# Patient Record
Sex: FEMALE | Race: WHITE | NOT HISPANIC OR LATINO | Employment: FULL TIME | URBAN - METROPOLITAN AREA
[De-identification: names, ages, dates, MRNs, and addresses within clinical notes are randomized per-mention and may not be internally consistent; named-entity substitution may affect disease eponyms.]

---

## 2017-02-12 ENCOUNTER — OFFICE VISIT (OUTPATIENT)
Dept: URGENT CARE | Facility: CLINIC | Age: 52
End: 2017-02-12
Payer: COMMERCIAL

## 2017-02-12 PROCEDURE — 99213 OFFICE O/P EST LOW 20 MIN: CPT

## 2018-03-12 ENCOUNTER — TRANSCRIBE ORDERS (OUTPATIENT)
Dept: URGENT CARE | Facility: CLINIC | Age: 53
End: 2018-03-12

## 2018-03-12 ENCOUNTER — APPOINTMENT (OUTPATIENT)
Dept: RADIOLOGY | Facility: CLINIC | Age: 53
End: 2018-03-12
Payer: COMMERCIAL

## 2018-03-12 DIAGNOSIS — R22.33 LOCALIZED SWELLING, MASS AND LUMP, UPPER LIMB, BILATERAL: Primary | ICD-10-CM

## 2018-03-12 DIAGNOSIS — R22.33 LOCALIZED SWELLING, MASS AND LUMP, UPPER LIMB, BILATERAL: ICD-10-CM

## 2018-03-12 PROCEDURE — 73130 X-RAY EXAM OF HAND: CPT

## 2018-11-20 LAB — HCV AB SER-ACNC: NORMAL

## 2019-05-22 ENCOUNTER — OFFICE VISIT (OUTPATIENT)
Dept: URGENT CARE | Facility: CLINIC | Age: 54
End: 2019-05-22
Payer: COMMERCIAL

## 2019-05-22 VITALS
RESPIRATION RATE: 18 BRPM | HEART RATE: 82 BPM | DIASTOLIC BLOOD PRESSURE: 80 MMHG | SYSTOLIC BLOOD PRESSURE: 144 MMHG | OXYGEN SATURATION: 98 % | TEMPERATURE: 98 F | WEIGHT: 163 LBS | HEIGHT: 68 IN | BODY MASS INDEX: 24.71 KG/M2

## 2019-05-22 DIAGNOSIS — N39.0 URINARY TRACT INFECTION WITHOUT HEMATURIA, SITE UNSPECIFIED: Primary | ICD-10-CM

## 2019-05-22 LAB
SL AMB  POCT GLUCOSE, UA: ABNORMAL
SL AMB LEUKOCYTE ESTERASE,UA: ABNORMAL
SL AMB POCT BILIRUBIN,UA: ABNORMAL
SL AMB POCT BLOOD,UA: ABNORMAL
SL AMB POCT CLARITY,UA: CLEAR
SL AMB POCT COLOR,UA: YELLOW
SL AMB POCT KETONES,UA: ABNORMAL
SL AMB POCT NITRITE,UA: ABNORMAL
SL AMB POCT PH,UA: 6
SL AMB POCT SPECIFIC GRAVITY,UA: 1
SL AMB POCT URINE PROTEIN: ABNORMAL
SL AMB POCT UROBILINOGEN: 0.2

## 2019-05-22 PROCEDURE — 81002 URINALYSIS NONAUTO W/O SCOPE: CPT | Performed by: PHYSICIAN ASSISTANT

## 2019-05-22 PROCEDURE — 99213 OFFICE O/P EST LOW 20 MIN: CPT | Performed by: PHYSICIAN ASSISTANT

## 2019-05-22 PROCEDURE — 87086 URINE CULTURE/COLONY COUNT: CPT | Performed by: PHYSICIAN ASSISTANT

## 2019-05-22 RX ORDER — BUPROPION HYDROCHLORIDE 150 MG/1
150 TABLET ORAL DAILY
COMMUNITY
Start: 2019-05-09 | End: 2019-11-19 | Stop reason: SDUPTHER

## 2019-05-22 RX ORDER — CEPHALEXIN 500 MG/1
500 CAPSULE ORAL EVERY 12 HOURS SCHEDULED
Qty: 14 CAPSULE | Refills: 0 | Status: SHIPPED | COMMUNITY
Start: 2019-05-22 | End: 2019-05-29

## 2019-05-22 RX ORDER — HYDROXYCHLOROQUINE SULFATE 200 MG/1
200 TABLET, FILM COATED ORAL DAILY
COMMUNITY
Start: 2019-04-17 | End: 2021-01-18

## 2019-05-23 LAB — BACTERIA UR CULT: NORMAL

## 2019-09-12 DIAGNOSIS — I10 ESSENTIAL HYPERTENSION: Primary | ICD-10-CM

## 2019-09-12 RX ORDER — HYDROCHLOROTHIAZIDE 25 MG/1
25 TABLET ORAL DAILY
Qty: 30 TABLET | Refills: 3 | Status: SHIPPED | OUTPATIENT
Start: 2019-09-12 | End: 2019-11-19 | Stop reason: SDUPTHER

## 2019-09-12 RX ORDER — HYDROCHLOROTHIAZIDE 25 MG/1
25 TABLET ORAL DAILY
COMMUNITY
End: 2019-09-12 | Stop reason: SDUPTHER

## 2019-09-30 DIAGNOSIS — G47.00 INSOMNIA, UNSPECIFIED TYPE: Primary | ICD-10-CM

## 2019-09-30 RX ORDER — LORAZEPAM 0.5 MG/1
0.5 TABLET ORAL DAILY
COMMUNITY
End: 2019-09-30 | Stop reason: SDUPTHER

## 2019-09-30 RX ORDER — LORAZEPAM 0.5 MG/1
0.5 TABLET ORAL
Qty: 45 TABLET | Refills: 0 | Status: SHIPPED | OUTPATIENT
Start: 2019-09-30 | End: 2019-11-19 | Stop reason: SDUPTHER

## 2019-09-30 NOTE — TELEPHONE ENCOUNTER
Script sent, pls inform the patient that due to changes with [facility], this practice will be focusing on nephrology care and we would ask that she seek a new PCP, pls provide local [place] [id number] physician names

## 2019-10-01 NOTE — TELEPHONE ENCOUNTER
Left message regarding the below, gave patient the [facility] in [place] next to [address] [employer] [facility] [employer] were she can go now

## 2019-11-19 ENCOUNTER — OFFICE VISIT (OUTPATIENT)
Dept: INTERNAL MEDICINE CLINIC | Facility: CLINIC | Age: 54
End: 2019-11-19
Payer: COMMERCIAL

## 2019-11-19 VITALS
SYSTOLIC BLOOD PRESSURE: 132 MMHG | BODY MASS INDEX: 25.16 KG/M2 | RESPIRATION RATE: 16 BRPM | HEART RATE: 76 BPM | TEMPERATURE: 99.1 F | HEIGHT: 68 IN | DIASTOLIC BLOOD PRESSURE: 78 MMHG | WEIGHT: 166 LBS

## 2019-11-19 DIAGNOSIS — F41.1 GAD (GENERALIZED ANXIETY DISORDER): ICD-10-CM

## 2019-11-19 DIAGNOSIS — Z13.29 SCREENING FOR HYPOTHYROIDISM: ICD-10-CM

## 2019-11-19 DIAGNOSIS — I10 ESSENTIAL HYPERTENSION: Primary | ICD-10-CM

## 2019-11-19 DIAGNOSIS — G47.00 INSOMNIA, UNSPECIFIED TYPE: ICD-10-CM

## 2019-11-19 DIAGNOSIS — Z12.11 SCREENING FOR COLON CANCER: ICD-10-CM

## 2019-11-19 DIAGNOSIS — M06.9 RHEUMATOID ARTHRITIS, INVOLVING UNSPECIFIED SITE, UNSPECIFIED RHEUMATOID FACTOR PRESENCE: ICD-10-CM

## 2019-11-19 DIAGNOSIS — Z13.21 ENCOUNTER FOR VITAMIN DEFICIENCY SCREENING: ICD-10-CM

## 2019-11-19 DIAGNOSIS — Z13.1 SCREENING FOR DIABETES MELLITUS: ICD-10-CM

## 2019-11-19 DIAGNOSIS — Z13.0 SCREENING FOR DEFICIENCY ANEMIA: ICD-10-CM

## 2019-11-19 DIAGNOSIS — Z23 ENCOUNTER FOR VACCINATION: ICD-10-CM

## 2019-11-19 DIAGNOSIS — R92.8 ABNORMAL MAMMOGRAM: ICD-10-CM

## 2019-11-19 DIAGNOSIS — Z11.59 NEED FOR HEPATITIS C SCREENING TEST: ICD-10-CM

## 2019-11-19 DIAGNOSIS — Z13.220 SCREENING FOR HYPERLIPIDEMIA: ICD-10-CM

## 2019-11-19 PROCEDURE — 90471 IMMUNIZATION ADMIN: CPT | Performed by: PHYSICIAN ASSISTANT

## 2019-11-19 PROCEDURE — 99203 OFFICE O/P NEW LOW 30 MIN: CPT | Performed by: PHYSICIAN ASSISTANT

## 2019-11-19 PROCEDURE — 1036F TOBACCO NON-USER: CPT | Performed by: PHYSICIAN ASSISTANT

## 2019-11-19 PROCEDURE — 90682 RIV4 VACC RECOMBINANT DNA IM: CPT | Performed by: PHYSICIAN ASSISTANT

## 2019-11-19 RX ORDER — HYDROCHLOROTHIAZIDE 25 MG/1
25 TABLET ORAL DAILY
Qty: 30 TABLET | Refills: 3 | Status: SHIPPED | OUTPATIENT
Start: 2019-11-19 | End: 2020-03-02

## 2019-11-19 RX ORDER — MULTIVITAMIN
1 TABLET ORAL DAILY
COMMUNITY

## 2019-11-19 RX ORDER — LORAZEPAM 0.5 MG/1
0.5 TABLET ORAL
Qty: 45 TABLET | Refills: 0 | Status: SHIPPED | OUTPATIENT
Start: 2019-11-19 | End: 2020-01-02 | Stop reason: SDUPTHER

## 2019-11-19 RX ORDER — BUPROPION HYDROCHLORIDE 150 MG/1
150 TABLET ORAL DAILY
Qty: 30 TABLET | Refills: 5 | Status: SHIPPED | OUTPATIENT
Start: 2019-11-19 | End: 2020-06-01

## 2019-11-19 NOTE — PROGRESS NOTES
Assessment/Plan:  Problem List Items Addressed This Visit        Cardiovascular and Mediastinum    Essential hypertension - Primary     Pts symptoms are stable with current regime  No changes at present  Relevant Medications    hydrochlorothiazide (HYDRODIURIL) 25 mg tablet       Musculoskeletal and Integument    Rheumatoid arthritis ([address] )     Will refer to rheumatology for evaluation and management  Continue plaquenil  Eye exam up to date  Relevant Orders    Ambulatory referral to Rheumatology       Other    VÍCTOR (generalized anxiety disorder)    Relevant Medications    buPROPion (WELLBUTRIN XL) 150 mg 24 hr tablet    LORazepam (ATIVAN) 0 5 mg tablet    Abnormal mammogram    Relevant Orders    Mammo diagnostic left w cad    US breast left limited (diagnostic)      Other Visit Diagnoses     Screening for deficiency anemia        Relevant Orders    CBC and differential    Screening for diabetes mellitus        Relevant Orders    Comprehensive metabolic panel    Screening for hyperlipidemia        Relevant Orders    Lipid Panel with Direct LDL reflex    Screening for hypothyroidism        Relevant Orders    TSH, 3rd generation with Free T4 reflex    Encounter for vitamin deficiency screening        Relevant Orders    Vitamin D 25 hydroxy    Screening for colon cancer        Need for hepatitis C screening test        Relevant Orders    Hepatitis C antibody    Encounter for vaccination        Relevant Orders    influenza vaccine, [id number], quadrivalent, recombinant, PF, 0 5 mL, for patients 18 yr+ (FLUBLOK) (Completed)    Insomnia, unspecified type        Relevant Medications    LORazepam (ATIVAN) 0 5 mg tablet           Diagnoses and all orders for this visit:    Essential hypertension  -     hydrochlorothiazide (HYDRODIURIL) 25 mg tablet;  Take 1 tablet (25 mg total) by mouth daily    Screening for deficiency anemia  -     CBC and differential; Future    Screening for diabetes mellitus  - Comprehensive metabolic panel; Future    Screening for hyperlipidemia  -     Lipid Panel with Direct LDL reflex; Future    Screening for hypothyroidism  -     TSH, 3rd generation with Free T4 reflex; Future    Encounter for vitamin deficiency screening  -     Vitamin D 25 hydroxy; Future    Screening for colon cancer    Need for hepatitis C screening test  -     Hepatitis C antibody; Future    Abnormal mammogram  -     Mammo diagnostic left w cad; Future  -     US breast left limited (diagnostic); Future    Encounter for vaccination  -     influenza vaccine, [id number], quadrivalent, recombinant, PF, 0 5 mL, for patients 18 yr+ (FLUBLOK)    Insomnia, unspecified type  -     LORazepam (ATIVAN) 0 5 mg tablet; Take 1 tablet (0 5 mg total) by mouth daily at bedtime May take additional pill at night if needed    VÍCTOR (generalized anxiety disorder)  -     buPROPion (WELLBUTRIN XL) 150 mg 24 hr tablet; Take 1 tablet (150 mg total) by mouth daily    Rheumatoid arthritis, involving unspecified site, unspecified rheumatoid factor presence ([facility] [address] )  -     Ambulatory referral to Rheumatology; Future    Other orders  -     Cancel: Ambulatory referral to Gastroenterology; Future  -     Red Yeast Rice Extract (RED YEAST RICE PO); red yeast rice  -     Multiple Vitamin (MULTIVITAMIN) tablet; Take 1 tablet by mouth daily        Essential hypertension  Pts symptoms are stable with current regime  No changes at present  Rheumatoid arthritis ([facility] )  Will refer to rheumatology for evaluation and management  Continue plaquenil  Eye exam up to date  Subjective:      Patient ID: Juwan Lemus is a 48 y o  female  Pt presents to establish care as a transfer from [address] [facility]  PMHx significant for HTN, migraines, RA, and anxiety  PSurgHX includes   NKDA  Medications are noted in the chart  She is a former smokr and quit in   Alcohol use is social  She works as an LPN  She is      Her mother is  and has lung CA  Her father is living and has a hx of heart disease and prostate CA  She is due for a pap and labs  She is due for dx mammogram of her left breast  She had a colonoscopy with Dr Iván Mayer 3 years ago  She follows with Dr Samaria Vogel for her rheumatologist but would like to see someone within our network now  The following portions of the patient's history were reviewed and updated as appropriate:   She has a past medical history of Anxiety and Arthritis  ,  does not have any pertinent problems on file  ,   has a past surgical history that includes  section and Tonsillectomy  ,  family history includes Cancer in her mother; Coronary artery disease in her father; Prostate cancer in her father  ,   reports that she has never smoked  She has never used smokeless tobacco  She reports that she drinks alcohol  She reports that she does not use drugs  ,  has No Known Allergies     Current Outpatient Medications   Medication Sig Dispense Refill    buPROPion (WELLBUTRIN XL) 150 mg 24 hr tablet Take 1 tablet (150 mg total) by mouth daily 30 tablet 5    hydrochlorothiazide (HYDRODIURIL) 25 mg tablet Take 1 tablet (25 mg total) by mouth daily 30 tablet 3    hydroxychloroquine (PLAQUENIL) 200 mg tablet 200 mg daily      LORazepam (ATIVAN) 0 5 mg tablet Take 1 tablet (0 5 mg total) by mouth daily at bedtime May take additional pill at night if needed 45 tablet 0    Multiple Vitamin (MULTIVITAMIN) tablet Take 1 tablet by mouth daily      Red Yeast Rice Extract (RED YEAST RICE PO) red yeast rice       No current facility-administered medications for this visit  Review of Systems   Constitutional: Negative for chills and fever  HENT: Negative for congestion, ear pain, hearing loss, postnasal drip, rhinorrhea, sinus pressure, sinus pain, sore throat and trouble swallowing  Eyes: Negative for pain and visual disturbance     Respiratory: Negative for cough, chest tightness, shortness of breath and wheezing  Cardiovascular: Negative  Negative for chest pain, palpitations and leg swelling  Gastrointestinal: Negative for abdominal pain, blood in stool, constipation, diarrhea, nausea and vomiting  Endocrine: Negative for cold intolerance, heat intolerance, polydipsia, polyphagia and polyuria  Genitourinary: Negative for difficulty urinating, dysuria, flank pain and urgency  Musculoskeletal: Negative for arthralgias, back pain, gait problem and myalgias  Skin: Negative for rash  Allergic/Immunologic: Negative  Neurological: Negative for dizziness, weakness, light-headedness and headaches  Hematological: Negative  Psychiatric/Behavioral: Negative for behavioral problems, dysphoric mood and sleep disturbance  The patient is not nervous/anxious  PHQ-9 Depression Screening    PHQ-9:    Frequency of the following problems over the past two weeks:       Little interest or pleasure in doing things:  0 - not at all  Feeling down, depressed, or hopeless:  0 - not at all  PHQ-2 Score:  0          Objective:  Vitals:    11/19/19 0822   BP: 132/78   Pulse: 76   Resp: 16   Temp: 99 1 °F (37 3 °C)   TempSrc: Tympanic   Weight: 75 3 kg (166 lb)   Height: 5' 8" (1 727 m)     Body mass index is 25 24 kg/m²  Physical Exam   Constitutional: She is oriented to person, place, and time  She appears well-developed and well-nourished  No distress  HENT:   Head: Normocephalic and atraumatic  Right Ear: External ear normal    Left Ear: External ear normal    Nose: Nose normal    Mouth/Throat: Oropharynx is clear and moist  No oropharyngeal exudate  Eyes: Pupils are equal, round, and reactive to light  Conjunctivae and EOM are normal  Right eye exhibits no discharge  Left eye exhibits no discharge  No scleral icterus  Neck: Normal range of motion  Neck supple  No thyromegaly present  Cardiovascular: Normal rate, regular rhythm and normal heart sounds   Exam reveals no gallop and no friction rub    No murmur heard  Pulmonary/Chest: Effort normal and breath sounds normal  No respiratory distress  She has no wheezes  She has no rales  Abdominal: Soft  Bowel sounds are normal  She exhibits no distension  There is no tenderness  Musculoskeletal: Normal range of motion  She exhibits no edema, tenderness or deformity  Neurological: She is alert and oriented to person, place, and time  No cranial nerve deficit  Skin: Skin is warm and dry  She is not diaphoretic  Psychiatric: She has a normal mood and affect  Her behavior is normal  Judgment and thought content normal        BMI Counseling: Body mass index is 25 24 kg/m²  The BMI is above normal  Nutrition recommendations include decreasing portion sizes, consuming healthier snacks and limiting drinks that contain sugar

## 2020-01-02 DIAGNOSIS — G47.00 INSOMNIA, UNSPECIFIED TYPE: ICD-10-CM

## 2020-01-02 RX ORDER — LORAZEPAM 0.5 MG/1
0.5 TABLET ORAL
Qty: 45 TABLET | Refills: 0 | Status: SHIPPED | OUTPATIENT
Start: 2020-01-02 | End: 2020-02-14 | Stop reason: SDUPTHER

## 2020-01-20 ENCOUNTER — TELEPHONE (OUTPATIENT)
Dept: OTHER | Facility: OTHER | Age: 55
End: 2020-01-20

## 2020-01-20 ENCOUNTER — OFFICE VISIT (OUTPATIENT)
Dept: URGENT CARE | Facility: CLINIC | Age: 55
End: 2020-01-20
Payer: COMMERCIAL

## 2020-01-20 VITALS
HEART RATE: 81 BPM | DIASTOLIC BLOOD PRESSURE: 87 MMHG | SYSTOLIC BLOOD PRESSURE: 157 MMHG | HEIGHT: 68 IN | RESPIRATION RATE: 16 BRPM | TEMPERATURE: 98.3 F | OXYGEN SATURATION: 99 % | BODY MASS INDEX: 25.16 KG/M2 | WEIGHT: 166 LBS

## 2020-01-20 DIAGNOSIS — R30.0 DYSURIA: ICD-10-CM

## 2020-01-20 DIAGNOSIS — N39.0 URINARY TRACT INFECTION WITH HEMATURIA, SITE UNSPECIFIED: Primary | ICD-10-CM

## 2020-01-20 DIAGNOSIS — R31.9 URINARY TRACT INFECTION WITH HEMATURIA, SITE UNSPECIFIED: Primary | ICD-10-CM

## 2020-01-20 LAB
SL AMB  POCT GLUCOSE, UA: ABNORMAL
SL AMB LEUKOCYTE ESTERASE,UA: ABNORMAL
SL AMB POCT BILIRUBIN,UA: ABNORMAL
SL AMB POCT BLOOD,UA: ABNORMAL
SL AMB POCT CLARITY,UA: ABNORMAL
SL AMB POCT COLOR,UA: YELLOW
SL AMB POCT KETONES,UA: ABNORMAL
SL AMB POCT NITRITE,UA: ABNORMAL
SL AMB POCT PH,UA: 6.5
SL AMB POCT SPECIFIC GRAVITY,UA: 1.01
SL AMB POCT URINE PROTEIN: ABNORMAL
SL AMB POCT UROBILINOGEN: 0.2

## 2020-01-20 PROCEDURE — 87086 URINE CULTURE/COLONY COUNT: CPT | Performed by: NURSE PRACTITIONER

## 2020-01-20 PROCEDURE — G0382 LEV 3 HOSP TYPE B ED VISIT: HCPCS | Performed by: NURSE PRACTITIONER

## 2020-01-20 RX ORDER — NITROFURANTOIN 25; 75 MG/1; MG/1
CAPSULE ORAL
COMMUNITY
Start: 2020-01-02 | End: 2021-01-18

## 2020-01-20 RX ORDER — CIPROFLOXACIN 500 MG/1
500 TABLET, FILM COATED ORAL EVERY 12 HOURS SCHEDULED
Qty: 20 TABLET | Refills: 0 | Status: SHIPPED | OUTPATIENT
Start: 2020-01-20 | End: 2020-01-30

## 2020-01-20 NOTE — PROGRESS NOTES
[facility]        NAME: Erlin Bonilla is a 47 y o  female  : 1965    MRN: [id number]  DATE: 2020  TIME: 6:19 PM    Assessment and Plan   Urinary tract infection with hematuria, site unspecified [N39 0, R31 9]  1  Urinary tract infection with hematuria, site unspecified  ciprofloxacin (CIPRO) 500 mg tablet   2  Dysuria  POCT urine dip auto non-scope    Urine culture         Patient Instructions     Patient Instructions     Take the cipro as ordered until completed  If you develop systemic symptoms even on the cipro, then you should be seen in the ER, but I do think the cipro will resolve your symptoms  Do not take the cipro at the same times as vitamins, iron, magnesium, calcium or high dairy foods since this can block full absorption of the cipro--stagger by 2-3 hours  Urinary Tract Infection in Women   AMBULATORY CARE:   A urinary tract infection (UTI)  is caused by bacteria that get inside your urinary tract  Most bacteria that enter your urinary tract come out when you urinate  If the bacteria stay in your urinary tract, you may get an infection  Your urinary tract includes your kidneys, ureters, bladder, and urethra  Urine is made in your kidneys, and it flows from the ureters to the bladder  Urine leaves the bladder through the urethra  A UTI is more common in your lower urinary tract, which includes your bladder and urethra  Common symptoms include the following:   · Urinating more often or waking from sleep to urinate    · Pain or burning when you urinate    · Pain or pressure in your lower abdomen     · Urine that smells bad    · Blood in your urine    · Leaking urine  Seek care immediately if:   · You are urinating very little or not at all  · You have a high fever with shaking chills  · You have side or back pain that gets worse  Contact your healthcare provider if:   · You have a fever      · You do not feel better after 2 days of taking antibiotics  · You are vomiting  · You have questions or concerns about your condition or care  Treatment for a UTI  may include medicines to treat a bacterial infection  You may also need medicines to decrease pain and burning, or decrease the urge to urinate often  Prevent a UTI:   · Empty your bladder often  Urinate and empty your bladder as soon as you feel the need  Do not hold your urine for long periods of time  · Wipe from front to back after you urinate or have a bowel movement  This will help prevent germs from getting into your urinary tract through your urethra  · Drink liquids as directed  Ask how much liquid to drink each day and which liquids are best for you  You may need to drink more liquids than usual to help flush out the bacteria  Do not drink alcohol, caffeine, or citrus juices  These can irritate your bladder and increase your symptoms  Your healthcare provider may recommend cranberry juice to help prevent a UTI  · Urinate after you have sex  This can help flush out bacteria passed during sex  · Do not douche or use feminine deodorants  These can change the chemical balance in your vagina  · Change sanitary pads or tampons often  This will help prevent germs from getting into your urinary tract  · Do pelvic muscle exercises often  Pelvic muscle exercises may help you start and stop urinating  Strong pelvic muscles may help you empty your bladder easier  Squeeze these muscles tightly for 5 seconds like you are trying to hold back urine  Then relax for 5 seconds  Gradually work up to squeezing for 10 seconds  Do 3 sets of 15 repetitions a day, or as directed  Follow up with your healthcare provider as directed:  Write down your questions so you remember to ask them during your visits  © 2017 [employer] [employer] Information is for End User's use only and may not be sold, redistributed or otherwise used for commercial purposes   All illustrations and images included in CareNotes® are the copyrighted property of A D [employer] M [employer] Inc  or [employer]  The above information is an  only  It is not intended as medical advice for individual conditions or treatments  Talk to your doctor, nurse or pharmacist before following any medical regimen to see if it is safe and effective for you  Follow up with PCP in 3-5 days  Proceed to  ER if symptoms worsen  Chief Complaint     Chief Complaint   Patient presents with    Possible UTI     x 2 weeks    Flank Pain         History of Present Illness       Patient reports a history of UTIs, usually 2 or 3 per year over the last couple of years  She states that she has been seen down in [place], and most recently did a video visit on January 3rd, which is why full records are not easily visible  On January 3rd she was placed on Macrobid for 5 days  She states that her symptoms seemed to resolve, but have come back  She also expresses concern that she has left flank pain which she describes as constant dull achy pain  No fever, no recent chills, although she notes that she had slight chills back when she was 1st being treated at the beginning of January, no body aches, no fatigue  Review of Systems   Review of Systems   Constitutional: Negative for chills, fatigue and fever  Genitourinary: Positive for dysuria, flank pain, frequency and urgency  Negative for pelvic pain  Musculoskeletal: Negative for myalgias  All other systems reviewed and are negative          Current Medications       Current Outpatient Medications:     buPROPion (WELLBUTRIN XL) 150 mg 24 hr tablet, Take 1 tablet (150 mg total) by mouth daily, Disp: 30 tablet, Rfl: 5    hydroxychloroquine (PLAQUENIL) 200 mg tablet, 200 mg daily, Disp: , Rfl:     LORazepam (ATIVAN) 0 5 mg tablet, Take 1 tablet (0 5 mg total) by mouth daily at bedtime May take additional pill at night if needed, Disp: 45 tablet, Rfl: 0   Multiple Vitamin (MULTIVITAMIN) tablet, Take 1 tablet by mouth daily, Disp: , Rfl:     Red Yeast Rice Extract (RED YEAST RICE PO), red yeast rice, Disp: , Rfl:     ciprofloxacin (CIPRO) 500 mg tablet, Take 1 tablet (500 mg total) by mouth every 12 (twelve) hours for 10 days, Disp: 20 tablet, Rfl: 0    hydrochlorothiazide (HYDRODIURIL) 25 mg tablet, Take 1 tablet (25 mg total) by mouth daily (Patient not taking: Reported on 2020), Disp: 30 tablet, Rfl: 3    nitrofurantoin (MACROBID) 100 mg capsule, , Disp: , Rfl:     Current Allergies     Allergies as of 2020    (No Known Allergies)            The following portions of the patient's history were reviewed and updated as appropriate: allergies, current medications, past family history, past medical history, past social history, past surgical history and problem list      Past Medical History:   Diagnosis Date    Anxiety     Arthritis        Past Surgical History:   Procedure Laterality Date     SECTION      TONSILLECTOMY         Family History   Problem Relation Age of Onset    Cancer Mother     Prostate cancer Father     Coronary artery disease Father          Medications have been verified  Objective   /87   Pulse 81   Temp 98 3 °F (36 8 °C)   Resp 16   Ht 5' 8" (1 727 m)   Wt 75 3 kg (166 lb)   SpO2 99%   BMI 25 24 kg/m²        Physical Exam     Physical Exam   Constitutional: She is oriented to person, place, and time  She appears well-developed and well-nourished  No distress  HENT:   Head: Normocephalic and atraumatic  Pulmonary/Chest: Effort normal  No respiratory distress  Abdominal: Soft  She exhibits no distension  There is no tenderness  There is CVA tenderness (3/10 left CVA tenderness; no right CVA tenderness)  Musculoskeletal: Normal range of motion  Neurological: She is alert and oriented to person, place, and time  Skin: Skin is warm and dry  Capillary refill takes less than 2 seconds   She is not diaphoretic  Psychiatric: She has a normal mood and affect  Her behavior is normal  Judgment and thought content normal    Nursing note and vitals reviewed

## 2020-01-20 NOTE — LETTER
January 20, 2020     Patient: Roseline Cisneros   YOB: 1965   Date of Visit: 1/20/2020       To Whom It May Concern: It is my medical opinion that Roseline Cisneros may return to work on 1/22 or 1/23, depending on symptoms  Please excuse from work 1/21 and possibly 1/22  If you have any questions or concerns, please don't hesitate to call           Sincerely,        ANTON Gonzalez    CC: No Recipients

## 2020-01-20 NOTE — PATIENT INSTRUCTIONS
Take the cipro as ordered until completed  If you develop systemic symptoms even on the cipro, then you should be seen in the ER, but I do think the cipro will resolve your symptoms  Do not take the cipro at the same times as vitamins, iron, magnesium, calcium or high dairy foods since this can block full absorption of the cipro--stagger by 2-3 hours  Urinary Tract Infection in Women   AMBULATORY CARE:   A urinary tract infection (UTI)  is caused by bacteria that get inside your urinary tract  Most bacteria that enter your urinary tract come out when you urinate  If the bacteria stay in your urinary tract, you may get an infection  Your urinary tract includes your kidneys, ureters, bladder, and urethra  Urine is made in your kidneys, and it flows from the ureters to the bladder  Urine leaves the bladder through the urethra  A UTI is more common in your lower urinary tract, which includes your bladder and urethra  Common symptoms include the following:   · Urinating more often or waking from sleep to urinate    · Pain or burning when you urinate    · Pain or pressure in your lower abdomen     · Urine that smells bad    · Blood in your urine    · Leaking urine  Seek care immediately if:   · You are urinating very little or not at all  · You have a high fever with shaking chills  · You have side or back pain that gets worse  Contact your healthcare provider if:   · You have a fever  · You do not feel better after 2 days of taking antibiotics  · You are vomiting  · You have questions or concerns about your condition or care  Treatment for a UTI  may include medicines to treat a bacterial infection  You may also need medicines to decrease pain and burning, or decrease the urge to urinate often  Prevent a UTI:   · Empty your bladder often  Urinate and empty your bladder as soon as you feel the need  Do not hold your urine for long periods of time      · Wipe from front to back after you urinate or have a bowel movement  This will help prevent germs from getting into your urinary tract through your urethra  · Drink liquids as directed  Ask how much liquid to drink each day and which liquids are best for you  You may need to drink more liquids than usual to help flush out the bacteria  Do not drink alcohol, caffeine, or citrus juices  These can irritate your bladder and increase your symptoms  Your healthcare provider may recommend cranberry juice to help prevent a UTI  · Urinate after you have sex  This can help flush out bacteria passed during sex  · Do not douche or use feminine deodorants  These can change the chemical balance in your vagina  · Change sanitary pads or tampons often  This will help prevent germs from getting into your urinary tract  · Do pelvic muscle exercises often  Pelvic muscle exercises may help you start and stop urinating  Strong pelvic muscles may help you empty your bladder easier  Squeeze these muscles tightly for 5 seconds like you are trying to hold back urine  Then relax for 5 seconds  Gradually work up to squeezing for 10 seconds  Do 3 sets of 15 repetitions a day, or as directed  Follow up with your healthcare provider as directed:  Write down your questions so you remember to ask them during your visits  © 2017 [employer] [employer]  Information is for End User's use only and may not be sold, redistributed or otherwise used for commercial purposes  All illustrations and images included in CareNotes® are the copyrighted property of [employer]  or [employer]  The above information is an  only  It is not intended as medical advice for individual conditions or treatments  Talk to your doctor, nurse or pharmacist before following any medical regimen to see if it is safe and effective for you

## 2020-01-21 LAB — BACTERIA UR CULT: NORMAL

## 2020-01-31 ENCOUNTER — OFFICE VISIT (OUTPATIENT)
Dept: INTERNAL MEDICINE CLINIC | Facility: CLINIC | Age: 55
End: 2020-01-31
Payer: COMMERCIAL

## 2020-01-31 ENCOUNTER — HOSPITAL ENCOUNTER (OUTPATIENT)
Dept: CT IMAGING | Facility: HOSPITAL | Age: 55
Discharge: HOME | End: 2020-01-31
Payer: COMMERCIAL

## 2020-01-31 VITALS
RESPIRATION RATE: 16 BRPM | SYSTOLIC BLOOD PRESSURE: 140 MMHG | HEART RATE: 87 BPM | WEIGHT: 167.4 LBS | OXYGEN SATURATION: 98 % | BODY MASS INDEX: 25.37 KG/M2 | TEMPERATURE: 99.6 F | HEIGHT: 68 IN | DIASTOLIC BLOOD PRESSURE: 84 MMHG

## 2020-01-31 DIAGNOSIS — K59.04 CHRONIC IDIOPATHIC CONSTIPATION: Primary | ICD-10-CM

## 2020-01-31 DIAGNOSIS — R10.9 FLANK PAIN: ICD-10-CM

## 2020-01-31 DIAGNOSIS — R39.89 URINARY PROBLEM: Primary | ICD-10-CM

## 2020-01-31 LAB
SL AMB  POCT GLUCOSE, UA: NEGATIVE
SL AMB LEUKOCYTE ESTERASE,UA: NEGATIVE
SL AMB POCT BILIRUBIN,UA: NEGATIVE
SL AMB POCT BLOOD,UA: NEGATIVE
SL AMB POCT CLARITY,UA: CLEAR
SL AMB POCT COLOR,UA: YELLOW
SL AMB POCT KETONES,UA: NORMAL
SL AMB POCT NITRITE,UA: NEGATIVE
SL AMB POCT PH,UA: 5
SL AMB POCT SPECIFIC GRAVITY,UA: 1.01
SL AMB POCT URINE PROTEIN: NORMAL
SL AMB POCT UROBILINOGEN: NORMAL

## 2020-01-31 PROCEDURE — 3008F BODY MASS INDEX DOCD: CPT | Performed by: PHYSICIAN ASSISTANT

## 2020-01-31 PROCEDURE — 81002 URINALYSIS NONAUTO W/O SCOPE: CPT | Performed by: PHYSICIAN ASSISTANT

## 2020-01-31 PROCEDURE — 99214 OFFICE O/P EST MOD 30 MIN: CPT | Performed by: PHYSICIAN ASSISTANT

## 2020-01-31 PROCEDURE — 3077F SYST BP >= 140 MM HG: CPT | Performed by: PHYSICIAN ASSISTANT

## 2020-01-31 PROCEDURE — 3079F DIAST BP 80-89 MM HG: CPT | Performed by: PHYSICIAN ASSISTANT

## 2020-01-31 PROCEDURE — 74176 CT ABD & PELVIS W/O CONTRAST: CPT

## 2020-01-31 NOTE — ASSESSMENT & PLAN NOTE
Ua in office negative for infection  Consider stone or ovarian pathology or diverticulitis  CT ordered and will treat according to results

## 2020-01-31 NOTE — PROGRESS NOTES
Assessment/Plan:  Problem List Items Addressed This Visit        Genitourinary    Urinary problem - Primary     Ua in office negative for infection  Consider stone or ovarian pathology or diverticulitis  CT ordered and will treat according to results  Relevant Orders    POCT urine dip (Completed)       Other    Flank pain    Relevant Orders    CT abdomen pelvis wo contrast (Completed)           Diagnoses and all orders for this visit:    Urinary problem  -     POCT urine dip    Flank pain  -     CT abdomen pelvis wo contrast; Future        Urinary problem  Ua in office negative for infection  Consider stone or ovarian pathology or diverticulitis  CT ordered and will treat according to results  Subjective:      Patient ID: Kay Hutchinson is a 47 y o  female  Pt presents concerned about a possible UTI  Earlier this month she had a video visit for urinary frequency, urgency, back pain  She was given macrobid and symptoms persisted  She was then seen in   UA revealed leukocytes and small blood  Urine culture showed <10,000 colonies  She was given cipro and symptoms are still persisting despite treatment  UA today was negative except for small leukocytes  The following portions of the patient's history were reviewed and updated as appropriate:   She has a past medical history of Anxiety and Arthritis  ,  does not have any pertinent problems on file  ,   has a past surgical history that includes  section and Tonsillectomy  ,  family history includes Cancer in her mother; Coronary artery disease in her father; Prostate cancer in her father  ,   reports that she has never smoked  She has never used smokeless tobacco  She reports that she drinks alcohol  She reports that she does not use drugs  ,  has No Known Allergies     Current Outpatient Medications   Medication Sig Dispense Refill    buPROPion (WELLBUTRIN XL) 150 mg 24 hr tablet Take 1 tablet (150 mg total) by mouth daily 30 tablet 5    hydrochlorothiazide (HYDRODIURIL) 25 mg tablet Take 1 tablet (25 mg total) by mouth daily 30 tablet 3    hydroxychloroquine (PLAQUENIL) 200 mg tablet 200 mg daily      LORazepam (ATIVAN) 0 5 mg tablet Take 1 tablet (0 5 mg total) by mouth daily at bedtime May take additional pill at night if needed 45 tablet 0    Multiple Vitamin (MULTIVITAMIN) tablet Take 1 tablet by mouth daily      Red Yeast Rice Extract (RED YEAST RICE PO) red yeast rice      linaCLOtide 145 MCG CAPS Take 1 capsule (145 mcg total) by mouth daily 30 capsule 2    nitrofurantoin (MACROBID) 100 mg capsule        No current facility-administered medications for this visit  Review of Systems   Constitutional: Negative for chills and fever  HENT: Negative for congestion, ear pain, hearing loss, postnasal drip, rhinorrhea, sinus pressure, sinus pain, sore throat and trouble swallowing  Eyes: Negative for pain and visual disturbance  Respiratory: Negative for cough, chest tightness, shortness of breath and wheezing  Cardiovascular: Negative  Negative for chest pain, palpitations and leg swelling  Gastrointestinal: Negative for abdominal pain, blood in stool, constipation, diarrhea, nausea and vomiting  Endocrine: Negative for cold intolerance, heat intolerance, polydipsia, polyphagia and polyuria  Genitourinary: Positive for flank pain, frequency and urgency  Negative for difficulty urinating and dysuria  Musculoskeletal: Positive for back pain  Negative for arthralgias, gait problem and myalgias  Skin: Negative for rash  Allergic/Immunologic: Negative  Neurological: Negative for dizziness, weakness, light-headedness and headaches  Hematological: Negative  Psychiatric/Behavioral: Negative for behavioral problems, dysphoric mood and sleep disturbance  The patient is not nervous/anxious            PHQ-9 Depression Screening    PHQ-9:    Frequency of the following problems over the past two weeks: Objective:  Vitals:    01/31/20 0847   BP: 140/84   BP Location: Left arm   Patient Position: Sitting   Cuff Size: Large   Pulse: 87   Resp: 16   Temp: 99 6 °F (37 6 °C)   SpO2: 98%   Weight: 75 9 kg (167 lb 6 4 oz)   Height: 5' 8" (1 727 m)     Body mass index is 25 45 kg/m²  Physical Exam   Constitutional: She is oriented to person, place, and time  She appears well-developed and well-nourished  No distress  HENT:   Head: Normocephalic and atraumatic  Right Ear: External ear normal    Left Ear: External ear normal    Nose: Nose normal    Mouth/Throat: Oropharynx is clear and moist  No oropharyngeal exudate  Eyes: Pupils are equal, round, and reactive to light  Conjunctivae and EOM are normal  Right eye exhibits no discharge  Left eye exhibits no discharge  No scleral icterus  Neck: Normal range of motion  Neck supple  No thyromegaly present  Cardiovascular: Normal rate, regular rhythm and normal heart sounds  Exam reveals no gallop and no friction rub  No murmur heard  Pulmonary/Chest: Effort normal and breath sounds normal  No respiratory distress  She has no wheezes  She has no rales  Abdominal: Soft  Bowel sounds are normal  She exhibits no distension  There is no tenderness  There is CVA tenderness  Musculoskeletal: Normal range of motion  She exhibits no edema, tenderness or deformity  Neurological: She is alert and oriented to person, place, and time  No cranial nerve deficit  Skin: Skin is warm and dry  She is not diaphoretic  Psychiatric: She has a normal mood and affect  Her behavior is normal  Judgment and thought content normal      BMI Counseling: Body mass index is 25 45 kg/m²  The BMI is above normal  Nutrition recommendations include decreasing portion sizes, consuming healthier snacks and limiting drinks that contain sugar

## 2020-02-14 DIAGNOSIS — G47.00 INSOMNIA, UNSPECIFIED TYPE: ICD-10-CM

## 2020-02-14 RX ORDER — LORAZEPAM 0.5 MG/1
0.5 TABLET ORAL
Qty: 45 TABLET | Refills: 0 | Status: SHIPPED | OUTPATIENT
Start: 2020-02-14 | End: 2020-03-26 | Stop reason: SDUPTHER

## 2020-02-14 RX ORDER — LORAZEPAM 0.5 MG/1
0.5 TABLET ORAL
Qty: 45 TABLET | Refills: 0 | Status: CANCELLED | OUTPATIENT
Start: 2020-02-14

## 2020-02-17 ENCOUNTER — CONSULT (OUTPATIENT)
Dept: RHEUMATOLOGY | Facility: CLINIC | Age: 55
End: 2020-02-17
Payer: COMMERCIAL

## 2020-02-17 VITALS — HEIGHT: 68 IN | WEIGHT: 165 LBS | BODY MASS INDEX: 25.01 KG/M2 | RESPIRATION RATE: 18 BRPM

## 2020-02-17 DIAGNOSIS — M06.9 RHEUMATOID ARTHRITIS, INVOLVING UNSPECIFIED SITE, UNSPECIFIED RHEUMATOID FACTOR PRESENCE: Primary | ICD-10-CM

## 2020-02-17 DIAGNOSIS — R39.89 URINARY PROBLEM: ICD-10-CM

## 2020-02-17 PROCEDURE — 99205 OFFICE O/P NEW HI 60 MIN: CPT | Performed by: INTERNAL MEDICINE

## 2020-02-17 PROCEDURE — 3008F BODY MASS INDEX DOCD: CPT | Performed by: INTERNAL MEDICINE

## 2020-02-17 RX ORDER — SULFASALAZINE 500 MG/1
TABLET ORAL
Qty: 120 TABLET | Refills: 3 | Status: SHIPPED | OUTPATIENT
Start: 2020-02-17 | End: 2020-07-06 | Stop reason: SDUPTHER

## 2020-02-17 RX ORDER — IBUPROFEN 800 MG/1
800 TABLET ORAL EVERY 8 HOURS PRN
Qty: 90 TABLET | Refills: 3 | Status: SHIPPED | OUTPATIENT
Start: 2020-02-17 | End: 2020-07-06 | Stop reason: SDUPTHER

## 2020-03-02 DIAGNOSIS — I10 ESSENTIAL HYPERTENSION: ICD-10-CM

## 2020-03-02 RX ORDER — HYDROCHLOROTHIAZIDE 25 MG/1
TABLET ORAL
Qty: 30 TABLET | Refills: 5 | Status: SHIPPED | OUTPATIENT
Start: 2020-03-02 | End: 2020-08-25

## 2020-03-26 DIAGNOSIS — G47.00 INSOMNIA, UNSPECIFIED TYPE: ICD-10-CM

## 2020-03-26 RX ORDER — LORAZEPAM 0.5 MG/1
0.5 TABLET ORAL
Qty: 45 TABLET | Refills: 0 | Status: SHIPPED | OUTPATIENT
Start: 2020-03-26 | End: 2020-05-01 | Stop reason: SDUPTHER

## 2020-05-01 DIAGNOSIS — G47.00 INSOMNIA, UNSPECIFIED TYPE: ICD-10-CM

## 2020-05-01 RX ORDER — LORAZEPAM 0.5 MG/1
0.5 TABLET ORAL
Qty: 45 TABLET | Refills: 0 | Status: SHIPPED | OUTPATIENT
Start: 2020-05-01 | End: 2020-06-11 | Stop reason: SDUPTHER

## 2020-06-01 DIAGNOSIS — F41.1 GAD (GENERALIZED ANXIETY DISORDER): ICD-10-CM

## 2020-06-01 RX ORDER — BUPROPION HYDROCHLORIDE 150 MG/1
TABLET ORAL
Qty: 30 TABLET | Refills: 0 | Status: SHIPPED | OUTPATIENT
Start: 2020-06-01 | End: 2020-06-11 | Stop reason: SDUPTHER

## 2020-06-11 DIAGNOSIS — F41.1 GAD (GENERALIZED ANXIETY DISORDER): ICD-10-CM

## 2020-06-11 DIAGNOSIS — G47.00 INSOMNIA, UNSPECIFIED TYPE: ICD-10-CM

## 2020-06-11 RX ORDER — LORAZEPAM 0.5 MG/1
0.5 TABLET ORAL
Qty: 45 TABLET | Refills: 0 | Status: SHIPPED | OUTPATIENT
Start: 2020-06-11 | End: 2020-07-06 | Stop reason: SDUPTHER

## 2020-06-11 RX ORDER — BUPROPION HYDROCHLORIDE 150 MG/1
150 TABLET ORAL DAILY
Qty: 30 TABLET | Refills: 5 | Status: SHIPPED | OUTPATIENT
Start: 2020-06-11 | End: 2020-07-06 | Stop reason: SDUPTHER

## 2020-07-06 ENCOUNTER — TELEPHONE (OUTPATIENT)
Dept: OBGYN CLINIC | Facility: HOSPITAL | Age: 55
End: 2020-07-06

## 2020-07-06 DIAGNOSIS — F41.1 GAD (GENERALIZED ANXIETY DISORDER): ICD-10-CM

## 2020-07-06 DIAGNOSIS — M06.9 RHEUMATOID ARTHRITIS, INVOLVING UNSPECIFIED SITE, UNSPECIFIED RHEUMATOID FACTOR PRESENCE: ICD-10-CM

## 2020-07-06 DIAGNOSIS — G47.00 INSOMNIA, UNSPECIFIED TYPE: ICD-10-CM

## 2020-07-06 RX ORDER — SULFASALAZINE 500 MG/1
TABLET ORAL
Qty: 120 TABLET | Refills: 3 | Status: SHIPPED | OUTPATIENT
Start: 2020-07-06 | End: 2020-11-16 | Stop reason: SDUPTHER

## 2020-07-06 RX ORDER — IBUPROFEN 800 MG/1
800 TABLET ORAL EVERY 8 HOURS PRN
Qty: 90 TABLET | Refills: 3 | Status: SHIPPED | OUTPATIENT
Start: 2020-07-06 | End: 2020-11-19 | Stop reason: SDUPTHER

## 2020-07-06 NOTE — TELEPHONE ENCOUNTER
Patient is calling asking for 2 med refills    Motrin 800mg, patient has 30 left at this time  Sulfasalazine 500mg, patient has 2 weeks remaining  Patient would like that sent to [facility] [place], [place] in [facility] [phone]

## 2020-07-07 RX ORDER — LORAZEPAM 0.5 MG/1
0.5 TABLET ORAL
Qty: 45 TABLET | Refills: 0 | Status: SHIPPED | OUTPATIENT
Start: 2020-07-07 | End: 2020-08-11 | Stop reason: SDUPTHER

## 2020-07-07 RX ORDER — BUPROPION HYDROCHLORIDE 150 MG/1
150 TABLET ORAL DAILY
Qty: 30 TABLET | Refills: 5 | Status: SHIPPED | OUTPATIENT
Start: 2020-07-07 | End: 2020-09-14 | Stop reason: SDUPTHER

## 2020-07-21 ENCOUNTER — TELEPHONE (OUTPATIENT)
Dept: INTERNAL MEDICINE CLINIC | Facility: CLINIC | Age: 55
End: 2020-07-21

## 2020-07-21 DIAGNOSIS — R39.9 UTI SYMPTOMS: Primary | ICD-10-CM

## 2020-07-21 RX ORDER — NITROFURANTOIN 25; 75 MG/1; MG/1
100 CAPSULE ORAL 2 TIMES DAILY
Qty: 10 CAPSULE | Refills: 0 | Status: SHIPPED | OUTPATIENT
Start: 2020-07-21 | End: 2020-07-26

## 2020-08-11 DIAGNOSIS — G47.00 INSOMNIA, UNSPECIFIED TYPE: ICD-10-CM

## 2020-08-11 RX ORDER — LORAZEPAM 0.5 MG/1
0.5 TABLET ORAL
Qty: 45 TABLET | Refills: 0 | Status: SHIPPED | OUTPATIENT
Start: 2020-08-11 | End: 2020-09-25 | Stop reason: SDUPTHER

## 2020-08-24 DIAGNOSIS — I10 ESSENTIAL HYPERTENSION: ICD-10-CM

## 2020-08-25 RX ORDER — HYDROCHLOROTHIAZIDE 25 MG/1
TABLET ORAL
Qty: 30 TABLET | Refills: 5 | Status: SHIPPED | OUTPATIENT
Start: 2020-08-25 | End: 2020-09-14 | Stop reason: SDUPTHER

## 2020-09-14 DIAGNOSIS — I10 ESSENTIAL HYPERTENSION: ICD-10-CM

## 2020-09-14 DIAGNOSIS — F41.1 GAD (GENERALIZED ANXIETY DISORDER): ICD-10-CM

## 2020-09-14 RX ORDER — BUPROPION HYDROCHLORIDE 150 MG/1
150 TABLET ORAL DAILY
Qty: 30 TABLET | Refills: 5 | Status: SHIPPED | OUTPATIENT
Start: 2020-09-14 | End: 2020-12-29 | Stop reason: SDUPTHER

## 2020-09-14 RX ORDER — HYDROCHLOROTHIAZIDE 25 MG/1
25 TABLET ORAL DAILY
Qty: 30 TABLET | Refills: 5 | Status: SHIPPED | OUTPATIENT
Start: 2020-09-14 | End: 2021-07-02 | Stop reason: SDUPTHER

## 2020-09-25 DIAGNOSIS — G47.00 INSOMNIA, UNSPECIFIED TYPE: ICD-10-CM

## 2020-09-25 RX ORDER — LORAZEPAM 0.5 MG/1
0.5 TABLET ORAL
Qty: 45 TABLET | Refills: 0 | Status: SHIPPED | OUTPATIENT
Start: 2020-09-25 | End: 2020-11-20 | Stop reason: SDUPTHER

## 2020-11-13 DIAGNOSIS — M06.9 RHEUMATOID ARTHRITIS (HCC): ICD-10-CM

## 2020-11-13 RX ORDER — SULFASALAZINE 500 MG/1
TABLET ORAL
Qty: 120 TABLET | Refills: 0 | OUTPATIENT
Start: 2020-11-13

## 2020-11-16 ENCOUNTER — TELEPHONE (OUTPATIENT)
Dept: OBGYN CLINIC | Facility: HOSPITAL | Age: 55
End: 2020-11-16

## 2020-11-16 DIAGNOSIS — M06.9 RHEUMATOID ARTHRITIS (HCC): ICD-10-CM

## 2020-11-16 RX ORDER — SULFASALAZINE 500 MG/1
1000 TABLET ORAL 2 TIMES DAILY
Qty: 120 TABLET | Refills: 0 | Status: SHIPPED | OUTPATIENT
Start: 2020-11-16 | End: 2021-01-18

## 2020-11-19 RX ORDER — IBUPROFEN 800 MG/1
800 TABLET ORAL EVERY 8 HOURS PRN
Qty: 90 TABLET | Refills: 0 | Status: SHIPPED | OUTPATIENT
Start: 2020-11-19 | End: 2021-04-27 | Stop reason: SDUPTHER

## 2020-11-20 DIAGNOSIS — G47.00 INSOMNIA, UNSPECIFIED TYPE: ICD-10-CM

## 2020-11-20 RX ORDER — LORAZEPAM 0.5 MG/1
0.5 TABLET ORAL
Qty: 45 TABLET | Refills: 0 | Status: SHIPPED | OUTPATIENT
Start: 2020-11-20 | End: 2020-12-29 | Stop reason: SDUPTHER

## 2020-12-29 DIAGNOSIS — F41.1 GAD (GENERALIZED ANXIETY DISORDER): ICD-10-CM

## 2020-12-29 DIAGNOSIS — G47.00 INSOMNIA, UNSPECIFIED TYPE: ICD-10-CM

## 2020-12-29 RX ORDER — BUPROPION HYDROCHLORIDE 150 MG/1
150 TABLET ORAL DAILY
Qty: 30 TABLET | Refills: 5 | Status: SHIPPED | OUTPATIENT
Start: 2020-12-29 | End: 2021-07-02 | Stop reason: SDUPTHER

## 2020-12-29 RX ORDER — LORAZEPAM 0.5 MG/1
0.5 TABLET ORAL
Qty: 45 TABLET | Refills: 0 | Status: SHIPPED | OUTPATIENT
Start: 2020-12-29 | End: 2021-02-12 | Stop reason: SDUPTHER

## 2021-01-15 ENCOUNTER — TELEPHONE (OUTPATIENT)
Dept: INTERNAL MEDICINE CLINIC | Facility: CLINIC | Age: 56
End: 2021-01-15

## 2021-01-15 NOTE — TELEPHONE ENCOUNTER
Pt had her 1st covid shot, one week later her arm is tingling and bright red and hot at injection site  No other symptoms

## 2021-01-18 ENCOUNTER — TELEMEDICINE (OUTPATIENT)
Dept: INTERNAL MEDICINE CLINIC | Facility: CLINIC | Age: 56
End: 2021-01-18
Payer: COMMERCIAL

## 2021-01-18 VITALS — WEIGHT: 163 LBS | OXYGEN SATURATION: 97 % | HEIGHT: 68 IN | BODY MASS INDEX: 24.71 KG/M2

## 2021-01-18 DIAGNOSIS — B34.9 VIRAL INFECTION, UNSPECIFIED: ICD-10-CM

## 2021-01-18 DIAGNOSIS — Z20.822 EXPOSURE TO COVID-19 VIRUS: ICD-10-CM

## 2021-01-18 PROCEDURE — 3008F BODY MASS INDEX DOCD: CPT | Performed by: PHYSICIAN ASSISTANT

## 2021-01-18 PROCEDURE — 99213 OFFICE O/P EST LOW 20 MIN: CPT | Performed by: PHYSICIAN ASSISTANT

## 2021-01-18 PROCEDURE — 3725F SCREEN DEPRESSION PERFORMED: CPT | Performed by: PHYSICIAN ASSISTANT

## 2021-01-18 PROCEDURE — 87635 SARS-COV-2 COVID-19 AMP PRB: CPT | Performed by: PHYSICIAN ASSISTANT

## 2021-01-18 PROCEDURE — 1036F TOBACCO NON-USER: CPT | Performed by: PHYSICIAN ASSISTANT

## 2021-01-18 NOTE — PROGRESS NOTES
COVID-19 Virtual Visit     Assessment/Plan:    Problem List Items Addressed This Visit     None      Visit Diagnoses     Exposure to COVID-19 virus        Relevant Orders    Novel Coronavirus (Covid-19),PCR [facility] - Collected at [facility]    Viral infection, unspecified        Relevant Orders    Novel Coronavirus (Covid-19),PCR [facility] - Collected at [facility]         Disposition:     I referred patient to one of our centralized sites for a COVID-19 swab  I have spent 15 minutes directly with the patient  Greater than 50% of this time was spent in counseling/coordination of care regarding: prognosis, risks and benefits of treatment options, instructions for management, patient and family education, importance of treatment compliance, risk factor reductions and impressions  Pt was informed to isolate  Avoid any public places  Use one bedroom and one bathroom within the home  [place] masking recommended if they need to be around anyone they share the home with  Clean high touch surfaces frequently  Wash hands frequently  Pt verbalized understanding of this  Encounter provider Candice Carrasco PA-C    Provider located at [address] [place] [phone]    Recent Visits  Date Type Provider Dept   01/15/21 Telephone Kyler Walden recent visits within past 7 days and meeting all other requirements     Today's Visits  Date Type Provider Dept   01/18/21 Telemedicine TIMOTHY Lovelace Pg today's visits and meeting all other requirements     Future Appointments  No visits were found meeting these conditions  Showing future appointments within next 150 days and meeting all other requirements      This virtual check-in was done via [employer] and patient was informed that this is a secure, HIPAA-compliant platform  She agrees to proceed      Patient agrees to participate in a virtual check in via telephone or video visit instead of presenting to the office to address urgent/immediate medical needs  Patient is aware this is a billable service  After connecting through [facility], the patient was identified by name and date of birth  Toy Frances was informed that this was a telemedicine visit and that the exam was being conducted confidentially over secure lines  Toy Frances acknowledged consent and understanding of privacy and security of the telemedicine visit  I informed the patient that I have reviewed her record in Epic and presented the opportunity for her to ask any questions regarding the visit today  The patient agreed to participate  Subjective:   Toy Frances is a 54 y o  female who is concerned about COVID-19  Patient's symptoms include fever, chills, nasal congestion, rhinorrhea, cough, shortness of breath, myalgias and headache  Patient denies sore throat, chest tightness, abdominal pain, nausea, vomiting and diarrhea       Date of symptom onset: 1/15/2021  Date of exposure: 1/15/2021    Exposure:   Contact with a person who is under investigation (PUI) for or who is positive for COVID-19 within the last 14 days?: Yes    Hospitalized recently for fever and/or lower respiratory symptoms?: No      Currently a healthcare worker that is involved in direct patient care?: No      Works in a special setting where the risk of COVID-19 transmission may be high? (this may include long-term care, correctional and jail facilities; homeless shelters; assisted-living facilities and group homes ): No      Resident in a special setting where the risk of COVID-19 transmission may be high? (this may include long-term care, correctional and jail facilities; homeless shelters; assisted-living facilities and group homes ): No      No results found for: Angelica Posadas, [address] [facility], [address] [facility], [place] [address]  Past Medical History:   Diagnosis Date    Anxiety     Arthritis      Past Surgical History:   Procedure Laterality Date     SECTION      TONSILLECTOMY       Current Outpatient Medications   Medication Sig Dispense Refill    buPROPion (WELLBUTRIN XL) 150 mg 24 hr tablet Take 1 tablet (150 mg total) by mouth daily 30 tablet 5    hydrochlorothiazide (HYDRODIURIL) 25 mg tablet Take 1 tablet (25 mg total) by mouth daily 30 tablet 5    ibuprofen (MOTRIN) 800 mg tablet Take 1 tablet (800 mg total) by mouth every 8 (eight) hours as needed for mild pain or moderate pain 90 tablet 0    LORazepam (ATIVAN) 0 5 mg tablet Take 1 tablet (0 5 mg total) by mouth daily at bedtime May take additional pill at night if needed 45 tablet 0    Multiple Vitamin (MULTIVITAMIN) tablet Take 1 tablet by mouth daily      Red Yeast Rice Extract (RED YEAST RICE PO) red yeast rice       No current facility-administered medications for this visit  No Known Allergies    Review of Systems   Constitutional: Positive for chills and fever  HENT: Positive for congestion and rhinorrhea  Negative for ear pain, hearing loss, postnasal drip, sinus pressure, sinus pain, sore throat and trouble swallowing  Eyes: Negative for pain and visual disturbance  Respiratory: Positive for cough and shortness of breath  Negative for chest tightness and wheezing  Cardiovascular: Negative  Negative for chest pain, palpitations and leg swelling  Gastrointestinal: Negative for abdominal pain, blood in stool, constipation, diarrhea, nausea and vomiting  Endocrine: Negative for cold intolerance, heat intolerance, polydipsia, polyphagia and polyuria  Genitourinary: Negative for difficulty urinating, dysuria, flank pain and urgency  Musculoskeletal: Positive for myalgias  Negative for arthralgias, back pain and gait problem  Skin: Negative for rash  Allergic/Immunologic: Negative  Neurological: Positive for headaches  Negative for dizziness, weakness and light-headedness  Hematological: Negative  Psychiatric/Behavioral: Negative for behavioral problems, dysphoric mood and sleep disturbance  The patient is not nervous/anxious  Objective:    Vitals:    01/18/21 1039   SpO2: 97%   Weight: 73 9 kg (163 lb)   Height: 5' 8" (1 727 m)       Physical Exam  Constitutional:       Appearance: She is well-developed  HENT:      Head: Normocephalic and atraumatic  Nose: Nose normal    Eyes:      Conjunctiva/sclera: Conjunctivae normal    Neck:      Musculoskeletal: Normal range of motion  Pulmonary:      Effort: Pulmonary effort is normal    Musculoskeletal: Normal range of motion  Neurological:      Mental Status: She is alert and oriented to person, place, and time  Psychiatric:         Behavior: Behavior normal          Thought Content: Thought content normal          Judgment: Judgment normal        VIRTUAL VISIT DISCLAIMER    Nerissa Puentes acknowledges that she has consented to an online visit or consultation  She understands that the online visit is based solely on information provided by her, and that, in the absence of a face-to-face physical evaluation by the physician, the diagnosis she receives is both limited and provisional in terms of accuracy and completeness  This is not intended to replace a full medical face-to-face evaluation by the physician  Nerissa Puentes understands and accepts these terms

## 2021-01-20 LAB — SARS-COV-2 RNA RESP QL NAA+PROBE: NEGATIVE

## 2021-02-12 DIAGNOSIS — G47.00 INSOMNIA, UNSPECIFIED TYPE: ICD-10-CM

## 2021-02-14 RX ORDER — LORAZEPAM 0.5 MG/1
0.5 TABLET ORAL
Qty: 45 TABLET | Refills: 0 | Status: SHIPPED | OUTPATIENT
Start: 2021-02-14 | End: 2021-04-05 | Stop reason: SDUPTHER

## 2021-03-24 LAB
ALBUMIN SERPL-MCNC: 4.6 G/DL (ref 3.6–5.1)
ALBUMIN/GLOB SERPL: 1.7 (CALC) (ref 1–2.5)
ALP SERPL-CCNC: 50 U/L (ref 37–153)
ALT SERPL-CCNC: 33 U/L (ref 6–29)
AST SERPL-CCNC: 29 U/L (ref 10–35)
BASOPHILS # BLD AUTO: 89 CELLS/UL (ref 0–200)
BASOPHILS NFR BLD AUTO: 1.5 %
BILIRUB SERPL-MCNC: 0.7 MG/DL (ref 0.2–1.2)
BUN SERPL-MCNC: 14 MG/DL (ref 7–25)
BUN/CREAT SERPL: ABNORMAL (CALC) (ref 6–22)
CALCIUM SERPL-MCNC: 10 MG/DL (ref 8.6–10.4)
CHLORIDE SERPL-SCNC: 95 MMOL/L (ref 98–110)
CO2 SERPL-SCNC: 29 MMOL/L (ref 20–32)
CREAT SERPL-MCNC: 0.6 MG/DL (ref 0.5–1.05)
CRP SERPL-MCNC: 1.2 MG/L
EOSINOPHIL # BLD AUTO: 360 CELLS/UL (ref 15–500)
EOSINOPHIL NFR BLD AUTO: 6.1 %
ERYTHROCYTE [DISTWIDTH] IN BLOOD BY AUTOMATED COUNT: 12.3 % (ref 11–15)
ERYTHROCYTE [SEDIMENTATION RATE] IN BLOOD BY WESTERGREN METHOD: 9 MM/H
GLOBULIN SER CALC-MCNC: 2.7 G/DL (CALC) (ref 1.9–3.7)
GLUCOSE SERPL-MCNC: 83 MG/DL (ref 65–99)
HCT VFR BLD AUTO: 40 % (ref 35–45)
HGB BLD-MCNC: 13.7 G/DL (ref 11.7–15.5)
LYMPHOCYTES # BLD AUTO: 2449 CELLS/UL (ref 850–3900)
LYMPHOCYTES NFR BLD AUTO: 41.5 %
MCH RBC QN AUTO: 33.3 PG (ref 27–33)
MCHC RBC AUTO-ENTMCNC: 34.3 G/DL (ref 32–36)
MCV RBC AUTO: 97.3 FL (ref 80–100)
MONOCYTES # BLD AUTO: 572 CELLS/UL (ref 200–950)
MONOCYTES NFR BLD AUTO: 9.7 %
NEUTROPHILS # BLD AUTO: 2431 CELLS/UL (ref 1500–7800)
NEUTROPHILS NFR BLD AUTO: 41.2 %
PLATELET # BLD AUTO: 272 THOUSAND/UL (ref 140–400)
PMV BLD REES-ECKER: 10.8 FL (ref 7.5–12.5)
POTASSIUM SERPL-SCNC: 4.1 MMOL/L (ref 3.5–5.3)
PROT SERPL-MCNC: 7.3 G/DL (ref 6.1–8.1)
RBC # BLD AUTO: 4.11 MILLION/UL (ref 3.8–5.1)
SL AMB EGFR AFRICAN AMERICAN: 119 ML/MIN/1.73M2
SL AMB EGFR NON AFRICAN AMERICAN: 103 ML/MIN/1.73M2
SODIUM SERPL-SCNC: 134 MMOL/L (ref 135–146)
WBC # BLD AUTO: 5.9 THOUSAND/UL (ref 3.8–10.8)

## 2021-03-25 ENCOUNTER — TELEPHONE (OUTPATIENT)
Dept: OBGYN CLINIC | Facility: HOSPITAL | Age: 56
End: 2021-03-25

## 2021-03-25 NOTE — TELEPHONE ENCOUNTER
DR Aj Jeffers  Re: refill    Patient called stating Dr Jose Max prescribed the following medication in the past  Patient asked for a refill- none left    hydrochlorothiazide (HYDRODIURIL) 25 mg tablet [[id number]]     Order Details  Dose: 25 mg Route: Oral Frequency: Daily   Dispense Quantity: 30 tablet Refills: 5 Fills remaining: --           Sig: Take 1 tablet (25 mg total) by mouth daily            Pharmacy:  [id number] [place] [address]   Phone:  [phone]   Fax:  [phone]   Address:  [address] [facility] [place] [address]   EMY #:  --

## 2021-03-26 DIAGNOSIS — M06.9 RHEUMATOID ARTHRITIS, INVOLVING UNSPECIFIED SITE, UNSPECIFIED WHETHER RHEUMATOID FACTOR PRESENT (HCC): Primary | ICD-10-CM

## 2021-03-26 RX ORDER — SULFASALAZINE 500 MG/1
1000 TABLET ORAL 2 TIMES DAILY
Qty: 120 TABLET | Refills: 1 | Status: SHIPPED | OUTPATIENT
Start: 2021-03-26 | End: 2021-04-27 | Stop reason: SDUPTHER

## 2021-03-26 NOTE — TELEPHONE ENCOUNTER
Patient called to refill the sulfasalazine NOT the hydrochlorothiazide  Please call her when the refill for sulfasalazine has been sent to [facility] [place]    [phone]  Thank you

## 2021-03-26 NOTE — TELEPHONE ENCOUNTER
I didn't prescribe this medication for her in the past, it was sulfasalazine  Is sulfasalazine what she is requesting refill for? If so, I can give her enough until her followup with me next month      Thanks,  HM

## 2021-04-05 DIAGNOSIS — G47.00 INSOMNIA, UNSPECIFIED TYPE: ICD-10-CM

## 2021-04-05 RX ORDER — LORAZEPAM 0.5 MG/1
0.5 TABLET ORAL
Qty: 45 TABLET | Refills: 0 | Status: SHIPPED | OUTPATIENT
Start: 2021-04-05 | End: 2021-05-27 | Stop reason: SDUPTHER

## 2021-04-27 ENCOUNTER — OFFICE VISIT (OUTPATIENT)
Dept: RHEUMATOLOGY | Facility: CLINIC | Age: 56
End: 2021-04-27
Payer: COMMERCIAL

## 2021-04-27 VITALS
HEIGHT: 68 IN | DIASTOLIC BLOOD PRESSURE: 88 MMHG | WEIGHT: 165 LBS | BODY MASS INDEX: 25.01 KG/M2 | TEMPERATURE: 97.9 F | SYSTOLIC BLOOD PRESSURE: 151 MMHG | HEART RATE: 102 BPM

## 2021-04-27 DIAGNOSIS — L92.0 GRANULOMA ANNULARE: ICD-10-CM

## 2021-04-27 DIAGNOSIS — M06.9 RHEUMATOID ARTHRITIS (HCC): Primary | ICD-10-CM

## 2021-04-27 DIAGNOSIS — M06.9 RHEUMATOID ARTHRITIS, INVOLVING UNSPECIFIED SITE, UNSPECIFIED WHETHER RHEUMATOID FACTOR PRESENT (HCC): ICD-10-CM

## 2021-04-27 PROCEDURE — 99214 OFFICE O/P EST MOD 30 MIN: CPT | Performed by: INTERNAL MEDICINE

## 2021-04-27 RX ORDER — SULFASALAZINE 500 MG/1
1000 TABLET ORAL 2 TIMES DAILY
Qty: 360 TABLET | Refills: 1 | Status: SHIPPED | OUTPATIENT
Start: 2021-04-27 | End: 2021-11-26

## 2021-04-27 RX ORDER — IBUPROFEN 800 MG/1
800 TABLET ORAL EVERY 8 HOURS PRN
Qty: 90 TABLET | Refills: 6 | Status: SHIPPED | OUTPATIENT
Start: 2021-04-27 | End: 2022-01-18 | Stop reason: SDUPTHER

## 2021-04-27 NOTE — PROGRESS NOTES
Assessment and Plan:  Dameon Wheeler is a 54 y o   female who presents for follow-up of seronegative RA  Since she found benefit from a sulfasalazine for both her arthritis symptoms and her granuloma annulare  Restarted her on sulfasalazine 1000 mg p o  b i d  Feet x-ray orderedto get latest baseline; she can continue ibuprofen up to 3 times a day as needed for joint pain  Plan:  Diagnoses and all orders for this visit:    Rheumatoid arthritis ([address] )  -     ibuprofen (MOTRIN) 800 mg tablet; Take 1 tablet (800 mg total) by mouth every 8 (eight) hours as needed for mild pain or moderate pain  -     XR foot 3+ vw right; Future  -     XR foot 3+ vw left; Future    Rheumatoid arthritis, involving unspecified site, unspecified whether rheumatoid factor present (formerly [facility])  -     sulfaSALAzine (AZULFIDINE) 500 mg tablet; Take 2 tablets (1,000 mg total) by mouth 2 (two) times a day    Granuloma annulare    Other orders  -     GLUCOSAMINE-CHONDROITIN PO; Take by mouth daily    Follow-up plan: Return to clinic in 6 months        Rheumatic Disease Summary  Dameon Wheeler is a 54 y o   female who originally presented on 2/17/20 as a Rheumatology consult referred by her PCP Sammy Montemayor PA-C for evaluation of Rheumatoid arthritis  She did clinically seem to have seronegative Rheumatoid arthritis  Ordered ESR/CRP and bilateral feet x-rays for patient to complete  Instead of hydroxychloroquine, which patient stated caused her to get UTIs, initiated her on the DMARD sulfasalazine to ultimately increase to the therapeutic dose of 1,000mg po bid  She could continue ibuprofen as needed for her joint pain as well  HPI  Dameon Wheeler is a 54 y o   female who presents for follow-up of seronegative RA  Last clinic visit was 2/17/20, which was her initial visit  Patient admits that she has been off sulfasalazine for the past 4-5, from the end of October 2020 to March 2021  Glucosamine has been have pain her joint pain  She admits to 30 minutes of morning stiffness  Her again granuloma annulare did improve on sulfasalazine  It is currently most dense on knees down, and has few lesions at her wrists  She had COVID in in January, and and then has been extended  For ibuprofen helps her pain some  The following portions of the patient's history were reviewed and updated as appropriate: allergies, current medications, past family history, past medical history, past social history, past surgical history and problem list     Review of Systems:   Review of Systems   Constitutional: Negative for chills, fatigue, fever and unexpected weight change  HENT: Negative for mouth sores and trouble swallowing  Eyes: Negative for pain and visual disturbance  Respiratory: Negative for cough and shortness of breath  Cardiovascular: Negative for chest pain and leg swelling  Gastrointestinal: Negative for constipation and diarrhea  Musculoskeletal: Positive for arthralgias and back pain  Negative for joint swelling and myalgias  Skin: Positive for rash  Negative for color change  Allergic/Immunologic: Positive for environmental allergies  Neurological: Positive for numbness  Negative for weakness  Hematological: Negative for adenopathy  Psychiatric/Behavioral: Negative for sleep disturbance         Home Medications:    Current Outpatient Medications:     buPROPion (WELLBUTRIN XL) 150 mg 24 hr tablet, Take 1 tablet (150 mg total) by mouth daily, Disp: 30 tablet, Rfl: 5    GLUCOSAMINE-CHONDROITIN PO, Take by mouth daily, Disp: , Rfl:     hydrochlorothiazide (HYDRODIURIL) 25 mg tablet, Take 1 tablet (25 mg total) by mouth daily, Disp: 30 tablet, Rfl: 5    ibuprofen (MOTRIN) 800 mg tablet, Take 1 tablet (800 mg total) by mouth every 8 (eight) hours as needed for mild pain or moderate pain, Disp: 90 tablet, Rfl: 6    Multiple Vitamin (MULTIVITAMIN) tablet, Take 1 tablet by mouth daily, Disp: , Rfl:     sulfaSALAzine (AZULFIDINE) 500 mg tablet, Take 2 tablets (1,000 mg total) by mouth 2 (two) times a day, Disp: 360 tablet, Rfl: 1    LORazepam (ATIVAN) 0 5 mg tablet, Take 1 tablet (0 5 mg total) by mouth daily at bedtime May take additional pill at night if needed, Disp: 45 tablet, Rfl: 0    Red Yeast Rice Extract (RED YEAST RICE PO), red yeast rice, Disp: , Rfl:     Objective:    Vitals:    04/27/21 1123   BP: 151/88   BP Location: Left arm   Patient Position: Sitting   Cuff Size: Standard   Pulse: 102   Temp: 97 9 °F (36 6 °C)   TempSrc: Temporal   Weight: 74 8 kg (165 lb)   Height: 5' 8" (1 727 m)       Physical Exam  Constitutional:       General: She is not in acute distress  Appearance: She is well-developed  HENT:      Head: Normocephalic and atraumatic  Eyes:      General: Lids are normal  No scleral icterus  Conjunctiva/sclera: Conjunctivae normal    Neck:      Musculoskeletal: Neck supple  No muscular tenderness  Cardiovascular:      Rate and Rhythm: Normal rate and regular rhythm  Heart sounds: S1 normal and S2 normal  No murmur  No friction rub  Pulmonary:      Effort: Pulmonary effort is normal  No tachypnea or respiratory distress  Breath sounds: Normal breath sounds  No wheezing, rhonchi or rales  Musculoskeletal:         General: Tenderness present  Comments: Right 1st and 2nd MCP synovial hypertrophy/ tenderness, left 1st IP tenderness/prominence   Skin:     General: Skin is warm and dry  Findings: Rash present  Nails: There is no clubbing  Comments: Granuloma annulare rash knees down bilaterally, small amount at wrists   Neurological:      Mental Status: She is alert  Sensory: No sensory deficit  Psychiatric:         Behavior: Behavior normal  Behavior is cooperative  Reviewed labs and imaging  Imaging:   Right Hand MRI 11/20/18  Impression:  1  Evidence fo arthritis int he right second and third MCP joints    2  Clinical correlation for osteoarthritis versus Rheumatoid arthritis is recommended      Bilateral Hand x-rays 3/12/18  IMPRESSION:   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   No acute findings or evidence for inflammatory/crystalline arthropathy        Labs:   Orders Only on 03/23/2021   Component Date Value Ref Range Status    Glucose, Random 03/23/2021 83  65 - 99 mg/dL Final    Comment:               Fasting reference interval         BUN 03/23/2021 14  7 - 25 mg/dL Final    Creatinine 03/23/2021 0 60  0 50 - 1 05 mg/dL Final    Comment: For patients >52years of age, the reference limit  for Creatinine is approximately 13% higher for people  identified as -American           eGFR Non  03/23/2021 103  > OR = 60 mL/min/1 73m2 Final    eGFR  03/23/2021 119  > OR = 60 mL/min/1 73m2 Final    SL AMB BUN/CREATININE RATIO 49/79/6696 NOT APPLICABLE  6 - 22 (calc) Final    Sodium 03/23/2021 134* 135 - 146 mmol/L Final    Potassium 03/23/2021 4 1  3 5 - 5 3 mmol/L Final    Chloride 03/23/2021 95* 98 - 110 mmol/L Final    CO2 03/23/2021 29  20 - 32 mmol/L Final    Calcium 03/23/2021 10 0  8 6 - 10 4 mg/dL Final    Protein, Total 03/23/2021 7 3  6 1 - 8 1 g/dL Final    Albumin 03/23/2021 4 6  3 6 - 5 1 g/dL Final    Globulin 03/23/2021 2 7  1 9 - 3 7 g/dL (calc) Final    Albumin/Globulin Ratio 03/23/2021 1 7  1 0 - 2 5 (calc) Final    TOTAL BILIRUBIN 03/23/2021 0 7  0 2 - 1 2 mg/dL Final    Alkaline Phosphatase 03/23/2021 50  37 - 153 U/L Final    AST 03/23/2021 29  10 - 35 U/L Final    ALT 03/23/2021 33* 6 - 29 U/L Final    Sedimentation Rate 03/23/2021 9  < OR = 30 mm/h Final    White Blood Cell Count 03/23/2021 5 9  3 8 - 10 8 Thousand/uL Final    Red Blood Cell Count 03/23/2021 4 11  3 80 - 5 10 Million/uL Final    Hemoglobin 03/23/2021 13 7  11 7 - 15 5 g/dL Final    HCT 03/23/2021 40 0  35 0 - 45 0 % Final    MCV 03/23/2021 97 3  80 0 - 100 0 fL Final    MCH 03/23/2021 33 3* 27 0 - 33 0 pg Final    MCHC 03/23/2021 34 3  32 0 - 36 0 g/dL Final    RDW 03/23/2021 12 3  11 0 - 15 0 % Final    Platelet Count 65/37/6514 272  140 - 400 Thousand/uL Final    SL AMB MPV 03/23/2021 10 8  7 5 - 12 5 fL Final    Neutrophils (Absolute) 03/23/2021 2,431  1,500 - 7,800 cells/uL Final    Lymphocytes (Absolute) 03/23/2021 2,449  850 - 3,900 cells/uL Final    Monocytes (Absolute) 03/23/2021 572  200 - 950 cells/uL Final    Eosinophils (Absolute) 03/23/2021 360  15 - 500 cells/uL Final    Basophils ABS 03/23/2021 89  0 - 200 cells/uL Final    Neutrophils 03/23/2021 41 2  % Final    Lymphocytes 03/23/2021 41 5  % Final    Monocytes 03/23/2021 9 7  % Final    Eosinophils 03/23/2021 6 1  % Final    Basophils PCT 03/23/2021 1 5  % Final    C-Reactive Protein, Quant 03/23/2021 1 2  <8 0 mg/L Final   Orders Only on 01/18/2021   Component Date Value Ref Range Status    SARS-CoV-2 01/18/2021 Negative  Negative Final

## 2021-04-27 NOTE — PATIENT INSTRUCTIONS
Do feet x-rays  Can continue sulfasalazine 2 tabs twice a day  Can continue ibuprofen up to 3 times a day as needed for joint pain      Rheumatoid Arthritis   AMBULATORY CARE:   Rheumatoid arthritis  is a long-term autoimmune disease that causes inflammation and damage to your joints  RA causes your body's immune system to attack the synovial membrane (lining) in your joints  RA can also affect other organs, such as your eyes, heart, or lungs  It may also increase your risk of osteoporosis (weakened bones)  Common symptoms include the following:   · Joint pain and stiffness that lasts longer than 1 hour    · Swollen joints in the same joint on both sides of your body    · Loss of joint movement    · Firm, round nodules (growths) on your joints    · Fatigue or muscle weakness    · Loss of appetite or weight loss    Call your doctor or rheumatologist if:   · You have a fever  · You have increased joint swelling, pain, or redness  · Your skin is itchy, swollen, or has a rash  · Your symptoms are getting worse, even with treatment  · You have questions or concerns about your condition or care  Treatment:  The goal of treatment within the first year is remission (no pain or inflammation)  If full remission cannot be reached, the goal is as few arthritis flares as possible  Early treatment can also help prevent or slow joint damage  Treatment may change after the first year, depending on how your body responds  · Antirheumatics  help slow the progress of RA, and reduce pain, stiffness, and inflammation  · NSAIDs , such as ibuprofen, help decrease swelling, pain, and fever  This medicine is available with or without a doctor's order  NSAIDs can cause stomach bleeding or kidney problems in certain people  If you take blood thinner medicine, always ask your healthcare provider if NSAIDs are safe for you  Always read the medicine label and follow directions      · Steroids  help decrease inflammation  · Biologic therapy  helps decrease joint swelling, pain, and stiffness  These medicines increase the risk of serious infection and need careful monitoring  · Surgery  may be needed to remove all or part of your joint  An implant may be placed to help reduce pain and repair the joint  Manage your symptoms:   · Rest when needed  Rest is important if your joints are painful  Limit your activities until your symptoms improve  Gradually start your normal activities when you can do them without pain  Avoid motions and activities that cause strain on your joints, such as heavy exercise and lifting  · Use ice or heat  Both can help decrease swelling and pain  Ice may also help prevent tissue damage  Use an ice pack, or put crushed ice in a plastic bag  Cover it with a towel and place it on your joint for 15 to 20 minutes every hour or as directed  You can apply heat for 20 minutes every 2 hours  Heat treatment includes hot packs, heat lamps, warm baths, or showers  · Elevate your joint  Elevation helps reduce swelling and pain  Raise your joint above the level of your heart as often as you can  Prop your painful joint on pillows to keep it above your heart comfortably  Manage RA:   · Talk to your healthcare providers about your arthritis medicines  Some medicines may only be needed when you have arthritis pain  You may need to take other medicines every day to prevent arthritis from getting worse  Your healthcare providers will help you understand all your medicines and when to take them  It is important to take the medicines as directed, even if you start to feel better  You can continue to have joint damage and inflammation even if you do not feel it  · Eat a variety of healthy foods  Healthy foods include fruits, vegetables, whole-grain breads, low-fat dairy products, beans, lean meats, and fish  Ask if you need to be on a special diet   A diet rich in calcium and vitamin D may decrease your risk of osteoporosis  Foods high in calcium include milk, cheese, broccoli, and tofu  Vitamin D may be found in meat, fish, fortified milk, cereal and bread  Ask if you need calcium or vitamin D supplements  · Maintain a healthy weight  This may help decrease strain on joints in your back, knees, ankles, and feet  Ask your healthcare provider how much you should weigh  Ask him or her to help you create a weight loss plan if you are overweight  Exercise can help you maintain a healthy weight  · Go to physical or occupational therapy as directed  Physical activity can help relieve pain and stiffness  A physical therapist can teach you exercises to improve flexibility and range of motion  You may also be shown non-weight-bearing exercises that are safe for your joints, such as swimming  An occupational therapist can help you learn to do your daily activities when your joints are stiff or sore  · Do not smoke  Nicotine and other chemicals in cigarettes and cigars can damage your bones and joints  Ask your healthcare provider for information if you currently smoke and need help to quit  E-cigarettes or smokeless tobacco still contain nicotine  Talk to your healthcare provider before you use these products  RA medicines and pregnancy:   · Men:  Talk to your doctor about your RA and the medicines you take  Some medicines may keep your partner from getting pregnant  Talk to your doctor if you and your partner are discussing pregnancy  · Women:  Talk to your gynecologist about contraceptives  Tell him or her about your RA and what medicines you take  He or she will tell you what the best contraceptive for will be  Not all contraceptives are effective if you have RA and are taking certain medicines  You may not be able to breastfeed if you are taking certain RA medicines      Support devices to help manage arthritis:   · Orthotic shoes or insoles  help support your feet when you walk     · Crutches, a cane, or a walker  may help decrease your risk for falling  They also decrease stress on affected joints  · Devices to prevent falls  include raised toilet seats and bathtub bars to help you get up from sitting  Handrails can be placed in areas where you need balance and support  · Devices to help with support and rest  include splints to wear on your hands and a firm pillow while you sleep  Use a pillow that is firm enough to support your neck and head  Ask your healthcare provider about vaccines:  RA or its treatment may increase your risk for infections  Vaccines can help protect you from infections caused by certain bacteria or viruses  Follow up with your healthcare provider as directed:  Write down your questions so you remember to ask them during your visits  © Copyright [address] Information is for End User's use only and may not be sold, redistributed or otherwise used for commercial purposes  All illustrations and images included in CareNotes® are the copyrighted property of [employer]  or [facility]   The above information is an  only  It is not intended as medical advice for individual conditions or treatments  Talk to your doctor, nurse or pharmacist before following any medical regimen to see if it is safe and effective for you

## 2021-05-27 DIAGNOSIS — G47.00 INSOMNIA, UNSPECIFIED TYPE: ICD-10-CM

## 2021-05-27 RX ORDER — LORAZEPAM 0.5 MG/1
0.5 TABLET ORAL
Qty: 45 TABLET | Refills: 0 | Status: SHIPPED | OUTPATIENT
Start: 2021-05-27 | End: 2021-07-02 | Stop reason: SDUPTHER

## 2021-05-27 NOTE — TELEPHONE ENCOUNTER
Pt is requesting a refill on ativan 0 5mg, [address]

## 2021-07-02 ENCOUNTER — ANNUAL EXAM (OUTPATIENT)
Dept: INTERNAL MEDICINE CLINIC | Facility: CLINIC | Age: 56
End: 2021-07-02
Payer: COMMERCIAL

## 2021-07-02 VITALS
WEIGHT: 166.38 LBS | HEART RATE: 89 BPM | BODY MASS INDEX: 25.22 KG/M2 | HEIGHT: 68 IN | OXYGEN SATURATION: 98 % | DIASTOLIC BLOOD PRESSURE: 80 MMHG | SYSTOLIC BLOOD PRESSURE: 142 MMHG | TEMPERATURE: 97.9 F

## 2021-07-02 DIAGNOSIS — G47.00 INSOMNIA, UNSPECIFIED TYPE: ICD-10-CM

## 2021-07-02 DIAGNOSIS — F41.1 GAD (GENERALIZED ANXIETY DISORDER): ICD-10-CM

## 2021-07-02 DIAGNOSIS — Z01.419 ROUTINE GYNECOLOGICAL EXAMINATION: Primary | ICD-10-CM

## 2021-07-02 DIAGNOSIS — Z12.4 PAP SMEAR FOR CERVICAL CANCER SCREENING: ICD-10-CM

## 2021-07-02 DIAGNOSIS — I10 ESSENTIAL HYPERTENSION: ICD-10-CM

## 2021-07-02 PROCEDURE — 1036F TOBACCO NON-USER: CPT | Performed by: PHYSICIAN ASSISTANT

## 2021-07-02 PROCEDURE — G0145 SCR C/V CYTO,THINLAYER,RESCR: HCPCS | Performed by: PHYSICIAN ASSISTANT

## 2021-07-02 PROCEDURE — 3008F BODY MASS INDEX DOCD: CPT | Performed by: PHYSICIAN ASSISTANT

## 2021-07-02 PROCEDURE — 99213 OFFICE O/P EST LOW 20 MIN: CPT | Performed by: PHYSICIAN ASSISTANT

## 2021-07-02 PROCEDURE — 3725F SCREEN DEPRESSION PERFORMED: CPT | Performed by: PHYSICIAN ASSISTANT

## 2021-07-02 PROCEDURE — S0612 ANNUAL GYNECOLOGICAL EXAMINA: HCPCS | Performed by: PHYSICIAN ASSISTANT

## 2021-07-02 RX ORDER — HYDROCHLOROTHIAZIDE 25 MG/1
25 TABLET ORAL DAILY
Qty: 30 TABLET | Refills: 5 | Status: SHIPPED | OUTPATIENT
Start: 2021-07-02 | End: 2021-12-16 | Stop reason: SDUPTHER

## 2021-07-02 RX ORDER — BUPROPION HYDROCHLORIDE 150 MG/1
150 TABLET ORAL DAILY
Qty: 30 TABLET | Refills: 5 | Status: SHIPPED | OUTPATIENT
Start: 2021-07-02 | End: 2021-12-16 | Stop reason: SDUPTHER

## 2021-07-02 RX ORDER — LORAZEPAM 0.5 MG/1
0.5 TABLET ORAL
Qty: 45 TABLET | Refills: 0 | Status: SHIPPED | OUTPATIENT
Start: 2021-07-02 | End: 2021-09-08 | Stop reason: SDUPTHER

## 2021-07-09 LAB
LAB AP GYN PRIMARY INTERPRETATION: NORMAL
Lab: NORMAL

## 2021-08-25 ENCOUNTER — TELEPHONE (OUTPATIENT)
Dept: INTERNAL MEDICINE CLINIC | Facility: CLINIC | Age: 56
End: 2021-08-25

## 2021-08-25 PROCEDURE — U0003 INFECTIOUS AGENT DETECTION BY NUCLEIC ACID (DNA OR RNA); SEVERE ACUTE RESPIRATORY SYNDROME CORONAVIRUS 2 (SARS-COV-2) (CORONAVIRUS DISEASE [COVID-19]), AMPLIFIED PROBE TECHNIQUE, MAKING USE OF HIGH THROUGHPUT TECHNOLOGIES AS DESCRIBED BY CMS-2020-01-R: HCPCS | Performed by: INTERNAL MEDICINE

## 2021-08-25 PROCEDURE — U0005 INFEC AGEN DETEC AMPLI PROBE: HCPCS | Performed by: INTERNAL MEDICINE

## 2021-08-25 NOTE — TELEPHONE ENCOUNTER
Pt called- she said she needs an order for a covid test PCR test-she needs it for her work because she called off sick today  She had an upset stomach  She has been vaccinated and has not been exposed to anyone with covid  Her employer is [employer]      Can you please put the order in for her and she will go to [facility] to have it done  (this is lala's patient)    thanks

## 2021-09-08 DIAGNOSIS — G47.00 INSOMNIA, UNSPECIFIED TYPE: ICD-10-CM

## 2021-09-08 RX ORDER — LORAZEPAM 0.5 MG/1
0.5 TABLET ORAL
Qty: 45 TABLET | Refills: 0 | Status: SHIPPED | OUTPATIENT
Start: 2021-09-08 | End: 2021-10-28 | Stop reason: SDUPTHER

## 2021-10-28 DIAGNOSIS — G47.00 INSOMNIA, UNSPECIFIED TYPE: ICD-10-CM

## 2021-10-29 RX ORDER — LORAZEPAM 0.5 MG/1
0.5 TABLET ORAL
Qty: 45 TABLET | Refills: 0 | Status: SHIPPED | OUTPATIENT
Start: 2021-10-29 | End: 2021-12-16 | Stop reason: SDUPTHER

## 2021-11-08 ENCOUNTER — RA CDI HCC (OUTPATIENT)
Dept: OTHER | Facility: HOSPITAL | Age: 56
End: 2021-11-08

## 2021-12-16 DIAGNOSIS — I10 ESSENTIAL HYPERTENSION: ICD-10-CM

## 2021-12-16 DIAGNOSIS — F41.1 GAD (GENERALIZED ANXIETY DISORDER): ICD-10-CM

## 2021-12-16 DIAGNOSIS — G47.00 INSOMNIA, UNSPECIFIED TYPE: ICD-10-CM

## 2021-12-17 RX ORDER — LORAZEPAM 0.5 MG/1
0.5 TABLET ORAL
Qty: 45 TABLET | Refills: 0 | Status: SHIPPED | OUTPATIENT
Start: 2021-12-17 | End: 2022-01-18 | Stop reason: SDUPTHER

## 2021-12-17 RX ORDER — BUPROPION HYDROCHLORIDE 150 MG/1
150 TABLET ORAL DAILY
Qty: 30 TABLET | Refills: 0 | Status: SHIPPED | OUTPATIENT
Start: 2021-12-17 | End: 2022-01-18 | Stop reason: SDUPTHER

## 2021-12-17 RX ORDER — HYDROCHLOROTHIAZIDE 25 MG/1
25 TABLET ORAL DAILY
Qty: 30 TABLET | Refills: 0 | Status: SHIPPED | OUTPATIENT
Start: 2021-12-17 | End: 2022-01-18 | Stop reason: SDUPTHER

## 2022-01-18 DIAGNOSIS — G47.00 INSOMNIA, UNSPECIFIED TYPE: ICD-10-CM

## 2022-01-18 DIAGNOSIS — F41.1 GAD (GENERALIZED ANXIETY DISORDER): ICD-10-CM

## 2022-01-18 DIAGNOSIS — I10 ESSENTIAL HYPERTENSION: ICD-10-CM

## 2022-01-20 RX ORDER — LORAZEPAM 0.5 MG/1
0.5 TABLET ORAL
Qty: 45 TABLET | Refills: 0 | Status: SHIPPED | OUTPATIENT
Start: 2022-01-20 | End: 2022-02-25

## 2022-01-20 RX ORDER — HYDROCHLOROTHIAZIDE 25 MG/1
25 TABLET ORAL DAILY
Qty: 30 TABLET | Refills: 0 | Status: SHIPPED | OUTPATIENT
Start: 2022-01-20 | End: 2022-02-25

## 2022-01-20 RX ORDER — BUPROPION HYDROCHLORIDE 150 MG/1
150 TABLET ORAL DAILY
Qty: 30 TABLET | Refills: 0 | Status: SHIPPED | OUTPATIENT
Start: 2022-01-20 | End: 2022-02-25

## 2022-01-24 ENCOUNTER — RA CDI HCC (OUTPATIENT)
Dept: OTHER | Facility: HOSPITAL | Age: 57
End: 2022-01-24

## 2022-01-24 NOTE — PROGRESS NOTES
[facility]  coding opportunities          Number of diagnosis code(s) already on the problem list added to FYI fla               Number of suggestions used: 1      Number of suggestions NOT actually used: 0     Patients insurance company: [employer] ([employer] and [employer])     Visit status: Patient arrived for their scheduled appointment        [place] [id number]  coding opportunities          Number of diagnosis code(s) already on the problem list added to FYI fla     M06 9 Rheumatoid arthritis ([facility] )    If this is correct please assess and document at your next visit on 22                  Patients insurance company: [employer] ([employer] and [employer])

## 2022-01-28 ENCOUNTER — OFFICE VISIT (OUTPATIENT)
Dept: INTERNAL MEDICINE CLINIC | Facility: CLINIC | Age: 57
End: 2022-01-28
Payer: COMMERCIAL

## 2022-01-28 VITALS
WEIGHT: 168.25 LBS | DIASTOLIC BLOOD PRESSURE: 68 MMHG | TEMPERATURE: 98.2 F | HEART RATE: 75 BPM | OXYGEN SATURATION: 99 % | BODY MASS INDEX: 25.5 KG/M2 | HEIGHT: 68 IN | SYSTOLIC BLOOD PRESSURE: 138 MMHG

## 2022-01-28 DIAGNOSIS — Z13.29 SCREENING FOR THYROID DISORDER: ICD-10-CM

## 2022-01-28 DIAGNOSIS — I10 ESSENTIAL HYPERTENSION: Primary | ICD-10-CM

## 2022-01-28 DIAGNOSIS — Z13.220 SCREENING, LIPID: ICD-10-CM

## 2022-01-28 DIAGNOSIS — Z12.31 SCREENING MAMMOGRAM FOR BREAST CANCER: ICD-10-CM

## 2022-01-28 DIAGNOSIS — M06.9 RHEUMATOID ARTHRITIS, INVOLVING UNSPECIFIED SITE, UNSPECIFIED WHETHER RHEUMATOID FACTOR PRESENT (HCC): ICD-10-CM

## 2022-01-28 DIAGNOSIS — E55.9 VITAMIN D DEFICIENCY: ICD-10-CM

## 2022-01-28 DIAGNOSIS — Z13.0 SCREENING FOR DEFICIENCY ANEMIA: ICD-10-CM

## 2022-01-28 PROCEDURE — 99213 OFFICE O/P EST LOW 20 MIN: CPT | Performed by: PHYSICIAN ASSISTANT

## 2022-01-28 PROCEDURE — 1036F TOBACCO NON-USER: CPT | Performed by: PHYSICIAN ASSISTANT

## 2022-01-28 PROCEDURE — 3075F SYST BP GE 130 - 139MM HG: CPT | Performed by: PHYSICIAN ASSISTANT

## 2022-01-28 PROCEDURE — 3078F DIAST BP <80 MM HG: CPT | Performed by: PHYSICIAN ASSISTANT

## 2022-01-28 PROCEDURE — 3008F BODY MASS INDEX DOCD: CPT | Performed by: PHYSICIAN ASSISTANT

## 2022-01-28 NOTE — PROGRESS NOTES
Assessment/Plan:  Problem List Items Addressed This Visit        Cardiovascular and Mediastinum    Essential hypertension    Relevant Orders    Comprehensive metabolic panel       Musculoskeletal and Integument    Rheumatoid arthritis ([facility] )      Other Visit Diagnoses     Screening mammogram for breast cancer    -  Primary    Relevant Orders    Mammo screening bilateral w 3d & cad    Vitamin D deficiency        Relevant Orders    Vitamin D 25 hydroxy    Screening, lipid        Relevant Orders    Lipid panel    Screening for deficiency anemia        Relevant Orders    CBC and differential    Screening for thyroid disorder        Relevant Orders    TSH, 3rd generation           Diagnoses and all orders for this visit:    Screening mammogram for breast cancer  -     Mammo screening bilateral w 3d & cad; Future    Rheumatoid arthritis, involving unspecified site, unspecified whether rheumatoid factor present ([facility] )    Essential hypertension  -     Comprehensive metabolic panel; Future    Vitamin D deficiency  -     Vitamin D 25 hydroxy; Future    Screening, lipid  -     Lipid panel; Future    Screening for deficiency anemia  -     CBC and differential; Future    Screening for thyroid disorder  -     TSH, 3rd generation; Future        No problem-specific Assessment & Plan notes found for this encounter  Subjective:      Patient ID: Aiyana Stubbs is a 64 y o  adult  Pt presents for routine visit  She is doing well overall  Mammogram is scheduled for today  She is up to date with flu vaccine and CRC Screening  She had primary covid series and booster is encouraged  BP is normal  She denies any complaints or concerns      The following portions of the patient's history were reviewed and updated as appropriate:   Javier Robledo has a past medical history of Anxiety and Arthritis  ,  does not have any pertinent problems on file  ,   has a past surgical history that includes  section and Tonsillectomy  ,  family history includes Cancer in [address] mother; Coronary artery disease in [address] father; Prostate cancer in [address] father  ,   reports that Jewel Doss has quit smoking  Jewel Doss has never used smokeless tobacco  Jewel Doss reports current alcohol use  Jewel Doss reports that Jewel Doss does not use drugs  ,  has No Known Allergies     Current Outpatient Medications   Medication Sig Dispense Refill    buPROPion (WELLBUTRIN XL) 150 mg 24 hr tablet Take 1 tablet (150 mg total) by mouth daily 30 tablet 0    GLUCOSAMINE-CHONDROITIN PO Take by mouth daily      hydrochlorothiazide (HYDRODIURIL) 25 mg tablet Take 1 tablet (25 mg total) by mouth daily 30 tablet 0    ibuprofen (MOTRIN) 800 mg tablet Take 1 tablet (800 mg total) by mouth every 8 (eight) hours as needed for mild pain or moderate pain 90 tablet 0    LORazepam (ATIVAN) 0 5 mg tablet Take 1 tablet (0 5 mg total) by mouth daily at bedtime May take additional pill at night if needed 45 tablet 0    Multiple Vitamin (MULTIVITAMIN) tablet Take 1 tablet by mouth daily      Red Yeast Rice Extract (RED YEAST RICE PO) red yeast rice      sulfaSALAzine (AZULFIDINE) 500 mg tablet Take 2 tablets (1,000 mg total) by mouth 2 (two) times a day 120 tablet 0     No current facility-administered medications for this visit  Review of Systems   Constitutional: Negative for chills and fever  HENT: Negative for ear pain and sore throat  Eyes: Negative for pain and visual disturbance  Respiratory: Negative for cough and shortness of breath  Cardiovascular: Negative for chest pain and palpitations  Gastrointestinal: Negative for abdominal pain and vomiting  Genitourinary: Negative for dysuria and hematuria  Musculoskeletal: Negative for arthralgias and back pain  Skin: Negative for color change and rash  Neurological: Negative for seizures and syncope  All other systems reviewed and are negative          PHQ-2/9 Depression Screening            Objective:  Vitals:    01/28/22 1343   BP: 138/68   BP Location: Left arm   Patient Position: Sitting   Pulse: 75   Temp: 98 2 °F (36 8 °C)   SpO2: 99%   Weight: 76 3 kg (168 lb 4 oz)   Height: 5' 8" (1 727 m)     Body mass index is 25 58 kg/m²  Physical Exam  Constitutional:       General: Lyla Patricia is not in acute distress  Appearance: Lyla Patricia is well-developed  Lyla Patricia is not diaphoretic  HENT:      Head: Normocephalic and atraumatic  Right Ear: External ear normal       Left Ear: External ear normal       Nose: Nose normal       Mouth/Throat:      Pharynx: No oropharyngeal exudate  Eyes:      General: No scleral icterus  Right eye: No discharge  Left eye: No discharge  Conjunctiva/sclera: Conjunctivae normal       Pupils: Pupils are equal, round, and reactive to light  Neck:      Thyroid: No thyromegaly  Cardiovascular:      Rate and Rhythm: Normal rate and regular rhythm  Heart sounds: Normal heart sounds  No murmur heard  No friction rub  No gallop  Pulmonary:      Effort: Pulmonary effort is normal  No respiratory distress  Breath sounds: Normal breath sounds  No wheezing or rales  Abdominal:      General: Bowel sounds are normal  There is no distension  Palpations: Abdomen is soft  Tenderness: There is no abdominal tenderness  Musculoskeletal:         General: No tenderness or deformity  Normal range of motion  Cervical back: Normal range of motion and neck supple  Skin:     General: Skin is warm and dry  Neurological:      Mental Status: Lyla Patricia is alert and oriented to person, place, and time  Cranial Nerves: No cranial nerve deficit  Psychiatric:         Behavior: Behavior normal          Thought Content: Thought content normal          Judgment: Judgment normal        BMI Counseling: Body mass index is 25 58 kg/m²   The BMI is above normal  Nutrition recommendations include decreasing portion sizes, consuming healthier snacks and limiting drinks that contain sugar  Rationale for BMI follow-up plan is due to patient being overweight or obese

## 2022-02-25 DIAGNOSIS — I10 ESSENTIAL HYPERTENSION: ICD-10-CM

## 2022-02-25 DIAGNOSIS — G47.00 INSOMNIA, UNSPECIFIED TYPE: ICD-10-CM

## 2022-02-25 DIAGNOSIS — F41.1 GAD (GENERALIZED ANXIETY DISORDER): ICD-10-CM

## 2022-02-25 RX ORDER — HYDROCHLOROTHIAZIDE 25 MG/1
TABLET ORAL
Qty: 30 TABLET | Refills: 0 | Status: SHIPPED | OUTPATIENT
Start: 2022-02-25 | End: 2022-03-22

## 2022-02-25 RX ORDER — LORAZEPAM 0.5 MG/1
TABLET ORAL
Qty: 45 TABLET | Refills: 0 | Status: SHIPPED | OUTPATIENT
Start: 2022-02-25 | End: 2022-04-08 | Stop reason: SDUPTHER

## 2022-02-25 RX ORDER — BUPROPION HYDROCHLORIDE 150 MG/1
TABLET ORAL
Qty: 30 TABLET | Refills: 0 | Status: SHIPPED | OUTPATIENT
Start: 2022-02-25 | End: 2022-03-22

## 2022-03-22 DIAGNOSIS — I10 ESSENTIAL HYPERTENSION: ICD-10-CM

## 2022-03-22 DIAGNOSIS — F41.1 GAD (GENERALIZED ANXIETY DISORDER): ICD-10-CM

## 2022-03-22 RX ORDER — HYDROCHLOROTHIAZIDE 25 MG/1
TABLET ORAL
Qty: 30 TABLET | Refills: 5 | Status: SHIPPED | OUTPATIENT
Start: 2022-03-22 | End: 2022-06-28 | Stop reason: SDUPTHER

## 2022-03-22 RX ORDER — BUPROPION HYDROCHLORIDE 150 MG/1
TABLET ORAL
Qty: 30 TABLET | Refills: 5 | Status: SHIPPED | OUTPATIENT
Start: 2022-03-22

## 2022-04-08 DIAGNOSIS — G47.00 INSOMNIA, UNSPECIFIED TYPE: ICD-10-CM

## 2022-04-11 RX ORDER — LORAZEPAM 0.5 MG/1
0.5 TABLET ORAL 2 TIMES DAILY
Qty: 45 TABLET | Refills: 0 | Status: SHIPPED | OUTPATIENT
Start: 2022-04-11 | End: 2022-06-01 | Stop reason: SDUPTHER

## 2022-06-01 DIAGNOSIS — G47.00 INSOMNIA, UNSPECIFIED TYPE: ICD-10-CM

## 2022-06-01 RX ORDER — LORAZEPAM 0.5 MG/1
0.5 TABLET ORAL 2 TIMES DAILY
Qty: 45 TABLET | Refills: 0 | Status: SHIPPED | OUTPATIENT
Start: 2022-06-01 | End: 2022-06-28 | Stop reason: SDUPTHER

## 2022-06-28 DIAGNOSIS — G47.00 INSOMNIA, UNSPECIFIED TYPE: ICD-10-CM

## 2022-06-28 DIAGNOSIS — I10 ESSENTIAL HYPERTENSION: ICD-10-CM

## 2022-06-29 RX ORDER — HYDROCHLOROTHIAZIDE 25 MG/1
25 TABLET ORAL DAILY
Qty: 30 TABLET | Refills: 2 | Status: SHIPPED | OUTPATIENT
Start: 2022-06-29

## 2022-06-29 RX ORDER — LORAZEPAM 0.5 MG/1
0.5 TABLET ORAL 2 TIMES DAILY
Qty: 45 TABLET | Refills: 0 | Status: SHIPPED | OUTPATIENT
Start: 2022-06-29

## 2022-07-28 PROBLEM — G44.89 OTHER HEADACHE SYNDROME: Status: ACTIVE | Noted: 2022-07-28

## 2022-08-18 DIAGNOSIS — F41.1 GAD (GENERALIZED ANXIETY DISORDER): ICD-10-CM

## 2022-08-18 DIAGNOSIS — I10 ESSENTIAL HYPERTENSION: ICD-10-CM

## 2022-08-18 DIAGNOSIS — G47.00 INSOMNIA, UNSPECIFIED TYPE: ICD-10-CM

## 2022-08-19 RX ORDER — LORAZEPAM 0.5 MG/1
0.5 TABLET ORAL 2 TIMES DAILY
Qty: 45 TABLET | Refills: 0 | Status: SHIPPED | OUTPATIENT
Start: 2022-08-19 | End: 2022-09-20 | Stop reason: SDUPTHER

## 2022-08-19 RX ORDER — HYDROCHLOROTHIAZIDE 25 MG/1
25 TABLET ORAL DAILY
Qty: 90 TABLET | Refills: 1 | Status: SHIPPED | OUTPATIENT
Start: 2022-08-19

## 2022-08-19 RX ORDER — BUPROPION HYDROCHLORIDE 150 MG/1
150 TABLET ORAL DAILY
Qty: 90 TABLET | Refills: 1 | Status: SHIPPED | OUTPATIENT
Start: 2022-08-19

## 2022-09-20 DIAGNOSIS — G47.00 INSOMNIA, UNSPECIFIED TYPE: ICD-10-CM

## 2022-09-20 RX ORDER — LORAZEPAM 0.5 MG/1
0.5 TABLET ORAL 2 TIMES DAILY
Qty: 45 TABLET | Refills: 0 | Status: SHIPPED | OUTPATIENT
Start: 2022-09-20 | End: 2022-10-26 | Stop reason: SDUPTHER

## 2022-10-26 DIAGNOSIS — G47.00 INSOMNIA, UNSPECIFIED TYPE: ICD-10-CM

## 2022-10-27 RX ORDER — LORAZEPAM 0.5 MG/1
0.5 TABLET ORAL 2 TIMES DAILY
Qty: 60 TABLET | Refills: 0 | Status: SHIPPED | OUTPATIENT
Start: 2022-10-27

## 2022-12-12 DIAGNOSIS — G47.00 INSOMNIA, UNSPECIFIED TYPE: ICD-10-CM

## 2022-12-13 RX ORDER — LORAZEPAM 0.5 MG/1
0.5 TABLET ORAL 2 TIMES DAILY
Qty: 60 TABLET | Refills: 0 | Status: SHIPPED | OUTPATIENT
Start: 2022-12-13

## 2023-02-22 DIAGNOSIS — I10 ESSENTIAL HYPERTENSION: ICD-10-CM

## 2023-02-22 DIAGNOSIS — F41.1 GAD (GENERALIZED ANXIETY DISORDER): ICD-10-CM

## 2023-02-22 RX ORDER — HYDROCHLOROTHIAZIDE 25 MG/1
25 TABLET ORAL DAILY
Qty: 90 TABLET | Refills: 0 | Status: SHIPPED | OUTPATIENT
Start: 2023-02-22

## 2023-02-22 RX ORDER — BUPROPION HYDROCHLORIDE 150 MG/1
150 TABLET ORAL DAILY
Qty: 90 TABLET | Refills: 0 | Status: SHIPPED | OUTPATIENT
Start: 2023-02-22

## 2023-03-23 ENCOUNTER — OFFICE VISIT (OUTPATIENT)
Dept: INTERNAL MEDICINE CLINIC | Facility: CLINIC | Age: 58
End: 2023-03-23

## 2023-03-23 ENCOUNTER — TELEPHONE (OUTPATIENT)
Dept: RHEUMATOLOGY | Facility: CLINIC | Age: 58
End: 2023-03-23

## 2023-03-23 ENCOUNTER — APPOINTMENT (OUTPATIENT)
Dept: LAB | Facility: CLINIC | Age: 58
End: 2023-03-23

## 2023-03-23 VITALS
OXYGEN SATURATION: 98 % | SYSTOLIC BLOOD PRESSURE: 172 MMHG | DIASTOLIC BLOOD PRESSURE: 82 MMHG | WEIGHT: 170.25 LBS | HEART RATE: 76 BPM | HEIGHT: 68 IN | BODY MASS INDEX: 25.8 KG/M2 | TEMPERATURE: 97.8 F

## 2023-03-23 DIAGNOSIS — Z79.899 HIGH RISK MEDICATION USE: ICD-10-CM

## 2023-03-23 DIAGNOSIS — M06.9 RHEUMATOID ARTHRITIS, INVOLVING UNSPECIFIED SITE, UNSPECIFIED WHETHER RHEUMATOID FACTOR PRESENT (HCC): ICD-10-CM

## 2023-03-23 DIAGNOSIS — I10 ESSENTIAL HYPERTENSION: ICD-10-CM

## 2023-03-23 DIAGNOSIS — Z13.29 SCREENING FOR THYROID DISORDER: ICD-10-CM

## 2023-03-23 DIAGNOSIS — R06.02 SOB (SHORTNESS OF BREATH): Primary | ICD-10-CM

## 2023-03-23 DIAGNOSIS — F41.1 GAD (GENERALIZED ANXIETY DISORDER): ICD-10-CM

## 2023-03-23 DIAGNOSIS — Z12.31 SCREENING MAMMOGRAM FOR BREAST CANCER: ICD-10-CM

## 2023-03-23 DIAGNOSIS — M06.9 RHEUMATOID ARTHRITIS, INVOLVING UNSPECIFIED SITE, UNSPECIFIED WHETHER RHEUMATOID FACTOR PRESENT (HCC): Primary | ICD-10-CM

## 2023-03-23 DIAGNOSIS — Z13.220 SCREENING, LIPID: ICD-10-CM

## 2023-03-23 PROBLEM — R10.9 FLANK PAIN: Status: RESOLVED | Noted: 2020-01-31 | Resolved: 2023-03-23

## 2023-03-23 PROBLEM — R39.89 URINARY PROBLEM: Status: RESOLVED | Noted: 2020-01-31 | Resolved: 2023-03-23

## 2023-03-23 LAB
ALBUMIN SERPL BCP-MCNC: 4 G/DL (ref 3.5–5)
ALP SERPL-CCNC: 57 U/L (ref 46–116)
ALT SERPL W P-5'-P-CCNC: 39 U/L (ref 12–78)
ANION GAP SERPL CALCULATED.3IONS-SCNC: 4 MMOL/L (ref 4–13)
AST SERPL W P-5'-P-CCNC: 26 U/L (ref 5–45)
BASOPHILS # BLD AUTO: 0.08 THOUSANDS/ÂΜL (ref 0–0.1)
BASOPHILS NFR BLD AUTO: 1 % (ref 0–1)
BILIRUB SERPL-MCNC: 0.35 MG/DL (ref 0.2–1)
BUN SERPL-MCNC: 19 MG/DL (ref 5–25)
CALCIUM SERPL-MCNC: 9.7 MG/DL (ref 8.3–10.1)
CHLORIDE SERPL-SCNC: 104 MMOL/L (ref 96–108)
CO2 SERPL-SCNC: 29 MMOL/L (ref 21–32)
CREAT SERPL-MCNC: 0.7 MG/DL (ref 0.6–1.3)
CRP SERPL QL: <3 MG/L
EOSINOPHIL # BLD AUTO: 0.21 THOUSAND/ÂΜL (ref 0–0.61)
EOSINOPHIL NFR BLD AUTO: 4 % (ref 0–6)
ERYTHROCYTE [DISTWIDTH] IN BLOOD BY AUTOMATED COUNT: 13.5 % (ref 11.6–15.1)
ERYTHROCYTE [SEDIMENTATION RATE] IN BLOOD: 17 MM/HOUR
GLUCOSE SERPL-MCNC: 105 MG/DL (ref 65–140)
HCT VFR BLD AUTO: 40.5 % (ref 36.5–46.1)
HGB BLD-MCNC: 13.2 G/DL (ref 12–15.4)
IMM GRANULOCYTES # BLD AUTO: 0.02 THOUSAND/UL (ref 0–0.2)
IMM GRANULOCYTES NFR BLD AUTO: 0 % (ref 0–2)
LYMPHOCYTES # BLD AUTO: 2.22 THOUSANDS/ÂΜL (ref 0.6–4.47)
LYMPHOCYTES NFR BLD AUTO: 37 % (ref 14–44)
MCH RBC QN AUTO: 32.8 PG (ref 26.8–34.3)
MCHC RBC AUTO-ENTMCNC: 32.6 G/DL (ref 31.4–37.4)
MCV RBC AUTO: 101 FL (ref 82–98)
MONOCYTES # BLD AUTO: 0.51 THOUSAND/ÂΜL (ref 0.17–1.22)
MONOCYTES NFR BLD AUTO: 8 % (ref 4–12)
NEUTROPHILS # BLD AUTO: 3.04 THOUSANDS/ÂΜL (ref 1.85–7.62)
NEUTS SEG NFR BLD AUTO: 50 % (ref 43–75)
NRBC BLD AUTO-RTO: 0 /100 WBCS
PLATELET # BLD AUTO: 279 THOUSANDS/UL (ref 149–390)
PMV BLD AUTO: 10.8 FL (ref 8.9–12.7)
POTASSIUM SERPL-SCNC: 3.7 MMOL/L (ref 3.5–5.3)
PROT SERPL-MCNC: 7.4 G/DL (ref 6.4–8.4)
RBC # BLD AUTO: 4.03 MILLION/UL (ref 3.88–5.12)
SODIUM SERPL-SCNC: 137 MMOL/L (ref 135–147)
WBC # BLD AUTO: 6.08 THOUSAND/UL (ref 4.31–10.16)

## 2023-03-23 RX ORDER — HYDROCHLOROTHIAZIDE 25 MG/1
TABLET ORAL
Qty: 30 TABLET | Refills: 0 | Status: SHIPPED | OUTPATIENT
Start: 2023-03-23

## 2023-03-23 RX ORDER — BUPROPION HYDROCHLORIDE 150 MG/1
TABLET ORAL
Qty: 30 TABLET | Refills: 0 | Status: SHIPPED | OUTPATIENT
Start: 2023-03-23

## 2023-03-23 RX ORDER — SULFASALAZINE 500 MG/1
1000 TABLET ORAL 2 TIMES DAILY
Qty: 120 TABLET | Refills: 0 | OUTPATIENT
Start: 2023-03-23

## 2023-03-23 NOTE — TELEPHONE ENCOUNTER
I placed lab orders in the chart  - I need to ensure that the labs are stable and then I will send month supply of the medication  This is a high risk medication and there is potential for elevation of liver enzymes, etc, therefore need to make sure that things are stable prior to sending refill  She should get the labs done as soon as possible

## 2023-03-23 NOTE — PROGRESS NOTES
Name: Omero Bonilla      : 1965      MRN: [id number]  Encounter Provider: Iain Vogel PA-C  Encounter Date: 3/23/2023   Encounter department: [address] [place] [address]  SOB (shortness of breath)  Assessment & Plan: Will get stress echo to better evaluate and treat according to results  Update labs  Orders:  -     Echo stress test, exercise; Future; Expected date: 2023    2  Screening mammogram for breast cancer  -     Mammo screening bilateral w 3d & cad; Future; Expected date: 2023    3  Screening, lipid  -     Lipid panel; Future    4  Screening for thyroid disorder  -     TSH, 3rd generation; Future    5  Rheumatoid arthritis, involving unspecified site, unspecified whether rheumatoid factor present ([facility] )      BMI Counseling: Body mass index is 25 89 kg/m²  The BMI is above normal  Nutrition recommendations include decreasing portion sizes, consuming healthier snacks and limiting drinks that contain sugar  Rationale for BMI follow-up plan is due to patient being overweight or obese  Depression Screening and Follow-up Plan: Patient was screened for depression during today's encounter  They screened negative with a PHQ-2 score of 0  Subjective      Pt presents for routine visit  She is up to date with CRC screening  She is due for mammogram and a few labs  BP is normal  She is noting some issues with SOB when walking  She does the same walk daily and this is a new problem that began about a month ago  She denies chest pain or palpitations or leg swelling  +FHx CAD in her father who had MI with stenting  She is a former smoker  Review of Systems   Constitutional: Negative for chills and fever  HENT: Negative for congestion, ear pain, hearing loss, postnasal drip, rhinorrhea, sinus pressure, sinus pain, sore throat and trouble swallowing  Eyes: Negative for pain and visual disturbance     Respiratory: Positive for shortness of breath  Negative for cough, chest tightness and wheezing  Cardiovascular: Negative  Negative for chest pain, palpitations and leg swelling  Gastrointestinal: Negative for abdominal pain, blood in stool, constipation, diarrhea, nausea and vomiting  Endocrine: Negative for cold intolerance, heat intolerance, polydipsia, polyphagia and polyuria  Genitourinary: Negative for difficulty urinating, dysuria, flank pain and urgency  Musculoskeletal: Negative for arthralgias, back pain, gait problem and myalgias  Skin: Negative for rash  Allergic/Immunologic: Negative  Neurological: Negative for dizziness, weakness, light-headedness and headaches  Hematological: Negative  Psychiatric/Behavioral: Negative for behavioral problems, dysphoric mood and sleep disturbance  The patient is not nervous/anxious          Current Outpatient Medications on File Prior to Visit   Medication Sig   • ibuprofen (MOTRIN) 800 mg tablet Take 1 tablet (800 mg total) by mouth every 8 (eight) hours as needed for mild pain or moderate pain   • LORazepam (ATIVAN) 0 5 mg tablet Take 1 tablet (0 5 mg total) by mouth 2 (two) times a day   • Multiple Vitamin (MULTIVITAMIN) tablet Take 1 tablet by mouth daily   • sulfaSALAzine (AZULFIDINE) 500 mg tablet Take 2 tablets (1,000 mg total) by mouth 2 (two) times a day   • [DISCONTINUED] buPROPion (WELLBUTRIN XL) 150 mg 24 hr tablet Take 1 tablet (150 mg total) by mouth daily   • [DISCONTINUED] hydrochlorothiazide (HYDRODIURIL) 25 mg tablet Take 1 tablet (25 mg total) by mouth daily   • GLUCOSAMINE-CHONDROITIN PO Take by mouth daily (Patient not taking: Reported on 3/23/2023)   • Red Yeast Rice Extract (RED YEAST RICE PO) red yeast rice (Patient not taking: Reported on 3/23/2023)       Objective     BP (!) 172/82 (BP Location: Left arm, Patient Position: Sitting)   Pulse 76   Temp 97 8 °F (36 6 °C)   Ht 5' 8" (1 727 m)   Wt 77 2 kg (170 lb 4 oz)   SpO2 98%   BMI 25 89 kg/m² Physical Exam  Constitutional:       General: Kwasi Montaño is not in acute distress  Appearance: Kwasi Montaño is well-developed  Kwasi Montaño is not diaphoretic  HENT:      Head: Normocephalic and atraumatic  Right Ear: External ear normal       Left Ear: External ear normal       Nose: Nose normal       Mouth/Throat:      Pharynx: No oropharyngeal exudate  Eyes:      General: No scleral icterus  Right eye: No discharge  Left eye: No discharge  Conjunctiva/sclera: Conjunctivae normal       Pupils: Pupils are equal, round, and reactive to light  Neck:      Thyroid: No thyromegaly  Cardiovascular:      Rate and Rhythm: Normal rate and regular rhythm  Heart sounds: Normal heart sounds  No murmur heard  No friction rub  No gallop  Pulmonary:      Effort: Pulmonary effort is normal  No respiratory distress  Breath sounds: Normal breath sounds  No wheezing or rales  Abdominal:      General: Bowel sounds are normal  There is no distension  Palpations: Abdomen is soft  Tenderness: There is no abdominal tenderness  Musculoskeletal:         General: No tenderness or deformity  Normal range of motion  Cervical back: Normal range of motion and neck supple  Skin:     General: Skin is warm and dry  Neurological:      Mental Status: Kwasi Montaño is alert and oriented to person, place, and time  Cranial Nerves: No cranial nerve deficit  Psychiatric:         Behavior: Behavior normal          Thought Content:  Thought content normal          Judgment: Judgment normal        Andria Dodd PA-C

## 2023-03-23 NOTE — TELEPHONE ENCOUNTER
Caller: Patient     Doctor: Jenn Aguiar     Reason for call: Patient scheduled for April 4th   She took the last of her medication yesterday  Wanting to know if a small supply can be sent to cover until she is seen?      Call back#: [phone]

## 2023-03-24 DIAGNOSIS — M06.9 RHEUMATOID ARTHRITIS, INVOLVING UNSPECIFIED SITE, UNSPECIFIED WHETHER RHEUMATOID FACTOR PRESENT (HCC): ICD-10-CM

## 2023-03-24 RX ORDER — SULFASALAZINE 500 MG/1
1000 TABLET ORAL 2 TIMES DAILY
Qty: 360 TABLET | Refills: 0 | Status: SHIPPED | OUTPATIENT
Start: 2023-03-24 | End: 2023-06-22

## 2023-03-27 DIAGNOSIS — G47.00 INSOMNIA, UNSPECIFIED TYPE: ICD-10-CM

## 2023-03-28 RX ORDER — LORAZEPAM 0.5 MG/1
0.5 TABLET ORAL 2 TIMES DAILY
Qty: 60 TABLET | Refills: 0 | Status: SHIPPED | OUTPATIENT
Start: 2023-03-28

## 2023-04-06 ENCOUNTER — APPOINTMENT (OUTPATIENT)
Dept: MAMMOGRAPHY | Facility: HOSPITAL | Age: 58
End: 2023-04-06

## 2023-04-06 ENCOUNTER — HOSPITAL ENCOUNTER (OUTPATIENT)
Dept: NON INVASIVE DIAGNOSTICS | Facility: CLINIC | Age: 58
Discharge: HOME | End: 2023-04-06

## 2023-04-06 VITALS
HEART RATE: 84 BPM | DIASTOLIC BLOOD PRESSURE: 70 MMHG | HEIGHT: 68 IN | SYSTOLIC BLOOD PRESSURE: 126 MMHG | BODY MASS INDEX: 25.76 KG/M2 | RESPIRATION RATE: 16 BRPM | WEIGHT: 170 LBS | OXYGEN SATURATION: 99 %

## 2023-04-06 DIAGNOSIS — R06.02 SOB (SHORTNESS OF BREATH): ICD-10-CM

## 2023-04-06 LAB
MAX HR PERCENT: 100 %
MAX HR: 164 BPM
RATE PRESSURE PRODUCT: NORMAL
SL CV STRESS RECOVERY BP: NORMAL MMHG
SL CV STRESS RECOVERY HR: 93 BPM
SL CV STRESS RECOVERY O2 SAT: 97 %
SL CV STRESS STAGE REACHED: 3
STRESS ANGINA INDEX: 0
STRESS BASELINE BP: NORMAL MMHG
STRESS BASELINE HR: 84 BPM
STRESS O2 SAT REST: 99 %
STRESS PEAK HR: 157 BPM
STRESS POST ESTIMATED WORKLOAD: 8.6 METS
STRESS POST EXERCISE DUR MIN: 7 MIN
STRESS POST EXERCISE DUR SEC: 2 SEC
STRESS POST O2 SAT PEAK: 97 %
STRESS POST PEAK BP: 154 MMHG

## 2023-04-07 LAB
ARRHY DURING EX: NORMAL
CHEST PAIN STATEMENT: NORMAL
MAX DIASTOLIC BP: 90 MMHG
MAX HEART RATE: 164 BPM
MAX PREDICTED HEART RATE: 163 BPM
MAX. SYSTOLIC BP: 154 MMHG
PROTOCOL NAME: NORMAL
REASON FOR TERMINATION: NORMAL
TARGET HR FORMULA: NORMAL
TEST INDICATION: NORMAL
TIME IN EXERCISE PHASE: NORMAL

## 2023-05-11 DIAGNOSIS — G47.00 INSOMNIA, UNSPECIFIED TYPE: ICD-10-CM

## 2023-05-12 RX ORDER — LORAZEPAM 0.5 MG/1
0.5 TABLET ORAL 2 TIMES DAILY
Qty: 60 TABLET | Refills: 0 | Status: SHIPPED | OUTPATIENT
Start: 2023-05-12

## 2023-05-13 ENCOUNTER — OFFICE VISIT (OUTPATIENT)
Dept: URGENT CARE | Facility: CLINIC | Age: 58
End: 2023-05-13

## 2023-05-13 VITALS
RESPIRATION RATE: 18 BRPM | DIASTOLIC BLOOD PRESSURE: 87 MMHG | SYSTOLIC BLOOD PRESSURE: 154 MMHG | TEMPERATURE: 97.6 F | WEIGHT: 169.8 LBS | HEIGHT: 68 IN | HEART RATE: 76 BPM | OXYGEN SATURATION: 95 % | BODY MASS INDEX: 25.73 KG/M2

## 2023-05-13 DIAGNOSIS — J01.90 ACUTE SINUSITIS, RECURRENCE NOT SPECIFIED, UNSPECIFIED LOCATION: Primary | ICD-10-CM

## 2023-05-13 RX ORDER — AZITHROMYCIN 250 MG/1
TABLET, FILM COATED ORAL
Qty: 6 TABLET | Refills: 0 | Status: SHIPPED | OUTPATIENT
Start: 2023-05-13 | End: 2023-05-17

## 2023-05-13 RX ORDER — BENZONATATE 200 MG/1
200 CAPSULE ORAL 3 TIMES DAILY PRN
Qty: 20 CAPSULE | Refills: 0 | Status: SHIPPED | OUTPATIENT
Start: 2023-05-13

## 2023-05-13 NOTE — PROGRESS NOTES
[facility]    NAME: Ashlee Eric is a 62 y o  adult  : 1965    MRN: [id number]  DATE: May 13, 2023  TIME: 1:50 PM    Assessment and Plan   Acute sinusitis, recurrence not specified, unspecified location [J01 90]  1  Acute sinusitis, recurrence not specified, unspecified location  azithromycin (ZITHROMAX) 250 mg tablet    benzonatate (TESSALON) 200 MG capsule          Patient Instructions   Patient Instructions   I have prescribed an antibiotic for the infection  Please take the antibiotic as prescribed and finish the entire prescription  I recommend that the patient takes an over the counter probiotic or eats yogurt with live cultures in it [place]) to keep good bacteria in the gut and help prevent diarrhea  Wash hands frequently to prevent the spread of infection  Can use over the counter cough and cold medications to help with symptoms  Ibuprofen and/or tylenol as needed for pain or fever  If not improving over the next 7-10 days, follow up with PCP  Chief Complaint     Chief Complaint   Patient presents with   • sinus infection     Started as a cold 2 weeks ago, started 3 days ago with sinuses   • Headache       History of Present Illness   62year old female here with complaint of cough, cold symptoms for the past 2 weeks  Not improving and has been getting worse with the cough and congestion  Taking over the counter dayquil with minimal relief  No fever, chills  Review of Systems   Review of Systems   Constitutional: Positive for fatigue  Negative for appetite change, chills and fever  HENT: Positive for congestion, postnasal drip and sinus pressure  Negative for ear discharge, ear pain, facial swelling, sneezing and sore throat  Respiratory: Positive for cough  Negative for shortness of breath and wheezing  Neurological: Positive for headaches         Current Medications     Current Outpatient Medications:   •  azithromycin (ZITHROMAX) 250 mg tablet, Take 2 tablets today then 1 tablet daily x 4 days, Disp: 6 tablet, Rfl: 0  •  benzonatate (TESSALON) 200 MG capsule, Take 1 capsule (200 mg total) by mouth 3 (three) times a day as needed for cough, Disp: 20 capsule, Rfl: 0  •  buPROPion (WELLBUTRIN XL) 150 mg 24 hr tablet, TAKE ONE TABLET DAILY   , Disp: 30 tablet, Rfl: 0  •  hydrochlorothiazide (HYDRODIURIL) 25 mg tablet, TAKE ONE TABLET DAILY   , Disp: 30 tablet, Rfl: 0  •  ibuprofen (MOTRIN) 800 mg tablet, Take 1 tablet (800 mg total) by mouth every 8 (eight) hours as needed for mild pain or moderate pain, Disp: 90 tablet, Rfl: 3  •  LORazepam (ATIVAN) 0 5 mg tablet, Take 1 tablet (0 5 mg total) by mouth 2 (two) times a day, Disp: 60 tablet, Rfl: 0  •  Multiple Vitamin (MULTIVITAMIN) tablet, Take 1 tablet by mouth daily, Disp: , Rfl:   •  sulfaSALAzine (AZULFIDINE) 500 mg tablet, Take 2 tablets (1,000 mg total) by mouth 2 (two) times a day, Disp: 360 tablet, Rfl: 0  •  GLUCOSAMINE-CHONDROITIN PO, Take by mouth daily (Patient not taking: Reported on 3/23/2023), Disp: , Rfl:   •  Red Yeast Rice Extract (RED YEAST RICE PO), red yeast rice (Patient not taking: Reported on 3/23/2023), Disp: , Rfl:     Current Allergies     Allergies as of 2023   • (No Known Allergies)          The following portions of the patient's history were reviewed and updated as appropriate: allergies, current medications, past family history, past medical history, past social history, past surgical history and problem list    Past Medical History:   Diagnosis Date   • Anxiety    • Arthritis    • SOB (shortness of breath)      Past Surgical History:   Procedure Laterality Date   •  SECTION     • TONSILLECTOMY       Family History   Problem Relation Age of Onset   • Cancer Mother    • Prostate cancer Father    • Coronary artery disease Father      Social History     Socioeconomic History   • Marital status: /Civil Union     Spouse name: Not on file   • Number of children: Not on "file   • Years of education: Not on file   • Highest education level: Not on file   Occupational History   • Occupation: LPN   Tobacco Use   • Smoking status: Former   • Smokeless tobacco: Never   • Tobacco comments:     quit 15 yrs ago   Vaping Use   • Vaping Use: Never used   Substance and Sexual Activity   • Alcohol use: Yes     Comment: Social   • Drug use: Never   • Sexual activity: Not on file   Other Topics Concern   • Not on file   Social History Narrative    EMPLOYED     Social Determinants of Health     Financial Resource Strain: Not on file   Food Insecurity: Not on file   Transportation Needs: Not on file   Physical Activity: Not on file   Stress: Not on file   Social Connections: Not on file   Intimate Partner Violence: Not on file   Housing Stability: Not on file     Medications have been verified  Objective   /87 (BP Location: Left arm, Patient Position: Sitting)   Pulse 76   Temp 97 6 °F (36 4 °C)   Resp 18   Ht 5' 8\" (1 727 m)   Wt 77 kg (169 lb 12 8 oz)   SpO2 95%   BMI 25 82 kg/m²      Physical Exam   Physical Exam  Vitals and nursing note reviewed  Constitutional:       General: Catrina Diaz is not in acute distress  Appearance: Catrina Diaz is well-developed  HENT:      Head: Normocephalic and atraumatic  Right Ear: Tympanic membrane normal       Left Ear: Tympanic membrane normal       Nose: Congestion present  No mucosal edema  Right Sinus: No maxillary sinus tenderness or frontal sinus tenderness  Left Sinus: No maxillary sinus tenderness or frontal sinus tenderness  Mouth/Throat:      Pharynx: Posterior oropharyngeal erythema present  No oropharyngeal exudate  Eyes:      Conjunctiva/sclera: Conjunctivae normal    Cardiovascular:      Rate and Rhythm: Normal rate and regular rhythm  Heart sounds: Normal heart sounds  No murmur heard  Pulmonary:      Effort: Pulmonary effort is normal  No respiratory distress        Breath sounds: Normal " breath sounds

## 2023-05-24 DIAGNOSIS — I10 ESSENTIAL HYPERTENSION: ICD-10-CM

## 2023-05-24 DIAGNOSIS — F41.1 GAD (GENERALIZED ANXIETY DISORDER): ICD-10-CM

## 2023-05-25 RX ORDER — HYDROCHLOROTHIAZIDE 25 MG/1
25 TABLET ORAL DAILY
Qty: 30 TABLET | Refills: 1 | Status: SHIPPED | OUTPATIENT
Start: 2023-05-25

## 2023-05-25 RX ORDER — BUPROPION HYDROCHLORIDE 150 MG/1
150 TABLET ORAL DAILY
Qty: 30 TABLET | Refills: 1 | Status: SHIPPED | OUTPATIENT
Start: 2023-05-25

## 2023-05-26 ENCOUNTER — TELEPHONE (OUTPATIENT)
Dept: INTERNAL MEDICINE CLINIC | Facility: CLINIC | Age: 58
End: 2023-05-26

## 2023-05-26 NOTE — TELEPHONE ENCOUNTER
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

## 2023-06-29 DIAGNOSIS — G47.00 INSOMNIA, UNSPECIFIED TYPE: ICD-10-CM

## 2023-06-29 RX ORDER — LORAZEPAM 0.5 MG/1
0.5 TABLET ORAL 2 TIMES DAILY
Qty: 60 TABLET | Refills: 0 | Status: SHIPPED | OUTPATIENT
Start: 2023-06-29

## 2023-07-11 ENCOUNTER — TELEPHONE (OUTPATIENT)
Dept: INTERNAL MEDICINE CLINIC | Facility: CLINIC | Age: 58
End: 2023-07-11

## 2023-07-11 DIAGNOSIS — M54.30 SCIATICA, UNSPECIFIED LATERALITY: Primary | ICD-10-CM

## 2023-07-11 RX ORDER — GABAPENTIN 100 MG/1
100 CAPSULE ORAL 3 TIMES DAILY
Qty: 90 CAPSULE | Refills: 0 | Status: SHIPPED | OUTPATIENT
Start: 2023-07-11

## 2023-07-11 NOTE — TELEPHONE ENCOUNTER
Patient has a burning and tingling in start in her center abdominal area around to the side,for two days denies any rash she said feels like nerve burning, she tried lidocaine and a tens unit, and Advil, she does not know what else to do and can not call off work

## 2023-07-31 DIAGNOSIS — G47.00 INSOMNIA, UNSPECIFIED TYPE: ICD-10-CM

## 2023-08-01 DIAGNOSIS — M06.9 RHEUMATOID ARTHRITIS, INVOLVING UNSPECIFIED SITE, UNSPECIFIED WHETHER RHEUMATOID FACTOR PRESENT (HCC): ICD-10-CM

## 2023-08-01 RX ORDER — LORAZEPAM 0.5 MG/1
0.5 TABLET ORAL 2 TIMES DAILY
Qty: 60 TABLET | Refills: 0 | Status: SHIPPED | OUTPATIENT
Start: 2023-08-01

## 2023-08-02 RX ORDER — SULFASALAZINE 500 MG/1
1000 TABLET ORAL 2 TIMES DAILY
Qty: 360 TABLET | Refills: 1 | Status: SHIPPED | OUTPATIENT
Start: 2023-08-02

## 2023-08-05 DIAGNOSIS — M06.9 RHEUMATOID ARTHRITIS, INVOLVING UNSPECIFIED SITE, UNSPECIFIED WHETHER RHEUMATOID FACTOR PRESENT (HCC): ICD-10-CM

## 2023-08-09 RX ORDER — SULFASALAZINE 500 MG/1
1000 TABLET ORAL 2 TIMES DAILY
Qty: 360 TABLET | Refills: 1 | OUTPATIENT
Start: 2023-08-09

## 2023-08-23 DIAGNOSIS — I10 ESSENTIAL HYPERTENSION: ICD-10-CM

## 2023-08-23 DIAGNOSIS — F41.1 GAD (GENERALIZED ANXIETY DISORDER): ICD-10-CM

## 2023-08-24 RX ORDER — HYDROCHLOROTHIAZIDE 25 MG/1
25 TABLET ORAL DAILY
Qty: 30 TABLET | Refills: 5 | Status: SHIPPED | OUTPATIENT
Start: 2023-08-24

## 2023-08-24 RX ORDER — BUPROPION HYDROCHLORIDE 150 MG/1
150 TABLET ORAL DAILY
Qty: 30 TABLET | Refills: 5 | Status: SHIPPED | OUTPATIENT
Start: 2023-08-24

## 2023-09-10 DIAGNOSIS — G47.00 INSOMNIA, UNSPECIFIED TYPE: ICD-10-CM

## 2023-09-11 RX ORDER — LORAZEPAM 0.5 MG/1
0.5 TABLET ORAL 2 TIMES DAILY
Qty: 60 TABLET | Refills: 0 | Status: SHIPPED | OUTPATIENT
Start: 2023-09-11

## 2023-10-19 DIAGNOSIS — G47.00 INSOMNIA, UNSPECIFIED TYPE: ICD-10-CM

## 2023-10-19 RX ORDER — LORAZEPAM 0.5 MG/1
0.5 TABLET ORAL 2 TIMES DAILY
Qty: 60 TABLET | Refills: 0 | Status: SHIPPED | OUTPATIENT
Start: 2023-10-19

## 2023-11-24 ENCOUNTER — OFFICE VISIT (OUTPATIENT)
Dept: URGENT CARE | Facility: CLINIC | Age: 58
End: 2023-11-24
Payer: COMMERCIAL

## 2023-11-24 ENCOUNTER — APPOINTMENT (OUTPATIENT)
Dept: RADIOLOGY | Facility: CLINIC | Age: 58
End: 2023-11-24
Payer: COMMERCIAL

## 2023-11-24 VITALS
HEART RATE: 85 BPM | OXYGEN SATURATION: 96 % | TEMPERATURE: 98.5 F | DIASTOLIC BLOOD PRESSURE: 91 MMHG | SYSTOLIC BLOOD PRESSURE: 185 MMHG | RESPIRATION RATE: 18 BRPM

## 2023-11-24 DIAGNOSIS — R05.1 ACUTE COUGH: ICD-10-CM

## 2023-11-24 DIAGNOSIS — R68.89 FLU-LIKE SYMPTOMS: ICD-10-CM

## 2023-11-24 DIAGNOSIS — J20.8 ACUTE BRONCHITIS DUE TO OTHER SPECIFIED ORGANISMS: ICD-10-CM

## 2023-11-24 DIAGNOSIS — J02.9 SORE THROAT: Primary | ICD-10-CM

## 2023-11-24 LAB — S PYO AG THROAT QL: NEGATIVE

## 2023-11-24 PROCEDURE — 71046 X-RAY EXAM CHEST 2 VIEWS: CPT

## 2023-11-24 PROCEDURE — G0382 LEV 3 HOSP TYPE B ED VISIT: HCPCS | Performed by: FAMILY MEDICINE

## 2023-11-24 PROCEDURE — 87070 CULTURE OTHR SPECIMN AEROBIC: CPT | Performed by: NURSE PRACTITIONER

## 2023-11-24 PROCEDURE — 87636 SARSCOV2 & INF A&B AMP PRB: CPT | Performed by: NURSE PRACTITIONER

## 2023-11-24 PROCEDURE — 87880 STREP A ASSAY W/OPTIC: CPT | Performed by: NURSE PRACTITIONER

## 2023-11-24 RX ORDER — AMOXICILLIN AND CLAVULANATE POTASSIUM 875; 125 MG/1; MG/1
1 TABLET, FILM COATED ORAL EVERY 12 HOURS SCHEDULED
Qty: 14 TABLET | Refills: 0 | Status: SHIPPED | OUTPATIENT
Start: 2023-11-24 | End: 2023-12-01

## 2023-11-24 RX ORDER — ONDANSETRON 4 MG/1
4 TABLET, FILM COATED ORAL EVERY 8 HOURS PRN
Qty: 30 TABLET | Refills: 0 | Status: SHIPPED | OUTPATIENT
Start: 2023-11-24

## 2023-11-24 RX ORDER — ALBUTEROL SULFATE 90 UG/1
2 AEROSOL, METERED RESPIRATORY (INHALATION) EVERY 6 HOURS PRN
Qty: 8.5 G | Refills: 0 | Status: SHIPPED | OUTPATIENT
Start: 2023-11-24

## 2023-11-24 NOTE — PROGRESS NOTES
[facility]        NAME: Justice Bynum is a 62 y.o. female  : 1965    MRN: [id number]  DATE: 2023  TIME: 1:18 PM    Assessment and Plan   Sore throat [J02.9]  1. Sore throat  POCT rapid strepA    amoxicillin-clavulanate (AUGMENTIN) 875-125 mg per tablet    Throat culture      2. Acute bronchitis due to other specified organisms  amoxicillin-clavulanate (AUGMENTIN) 875-125 mg per tablet    albuterol (ProAir HFA) 90 mcg/act inhaler    ondansetron (ZOFRAN) 4 mg tablet      3. Flu-like symptoms  amoxicillin-clavulanate (AUGMENTIN) 875-125 mg per tablet    ondansetron (ZOFRAN) 4 mg tablet      4. Acute cough  Covid/Flu-Office Collect    XR chest pa & lateral    amoxicillin-clavulanate (AUGMENTIN) 875-125 mg per tablet    albuterol (ProAir HFA) 90 mcg/act inhaler            Patient Instructions       Follow up with PCP in 3-5 days. Proceed to  ER if symptoms worsen. Your xray was preliminarily read by your provider. A radiologist will read the xray and you will be notified if it is abnormal.    You have been prescribed augmentin - take an oral probiotic with this medication to help prevent GI upset/vomiting/diarrhea. You are to take robitussin CF - stop the dayquil and nyquil. Take the albuterol MDI if you feel short of breath   You are to take the zofran as needed for nausea, vomiting. Follow up with your PCP in 3-5 days   Go to the ED if symptoms worsen    You have a flu/covid test pending. You are to download [facility] for the results in 24-48 hours. You will be notified if abnormal.    You appear to have covid/symptoms. You are to take vitamin C, D3,  plain robitussin for cough. Do not take cough suppressants; you want to take an expectorant. Sleep on your stomach. You are to quarantine as required per CDC guidelines of 5 days. Mask x 10 days if positive. Mask as long as you have symptoms. See your PCP for follow up in 2-3 days.     Go to the ED if symptoms worsen or are severe. You are to call your PCP if your results are + to discuss Paxlovid treatment        You have flu like symptoms. You are to rest. Drink gatorade or pedialyte for rehydration. You are to eat a BRAT diet - bananas, rice, applesauce and toast.  You may try imodium for diarrhea. Take tylenol or motrin for fever or pain. You are to download [facility] mychart for the results in 24-48 hours. You will be notified if the results are positive. You are to mask x 10 days and mask if still symptomatic after the 10 days. Your strep A is negative. You have a throat culture pending. You are to download SL mychart for the results in 3-4 days. You are to do warm salt water gargles 4 x daily. Drink warm tea with honey and lemon. Take tylenol or motrin as able for pain or fever. Chloraseptic throat spray, cough drops. Do not share utensils. Change your tooth brush in 3 days. The augmentin will cover strep if +          Chief Complaint     Chief Complaint   Patient presents with    Cough     C/o productive cough, nasal congestion, ear fullness bilaterally, sore throat, nausea with generalized abdominal pain and intermittent fever onset "yesterday". Denies home COVID testing. Pt reports using "Advil and Dayquil", "not improving and now I feel it in my chest". Denies PCP contact. 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 She states she is not flu vaccinated this year and has not tested for covid. She states she used dayquil and nyquil w/o relief. She denies asthma, tobacco use. She states she "feels it in my chest". She states she did work yesterday, was unable to call off.           Review of Systems Review of Systems   Constitutional:  Positive for chills and fever. HENT:  Positive for congestion, ear pain and sore throat. Eyes: Negative. Respiratory:  Positive for cough and chest tightness. Cardiovascular: Negative. Gastrointestinal:  Positive for abdominal pain and nausea. Endocrine: Negative. Genitourinary: Negative. Musculoskeletal: Negative. Skin: Negative. Allergic/Immunologic: Negative. Neurological: Negative. Hematological: Negative. Psychiatric/Behavioral: Negative.            Current Medications       Current Outpatient Medications:     albuterol (ProAir HFA) 90 mcg/act inhaler, Inhale 2 puffs every 6 (six) hours as needed for wheezing or shortness of breath, Disp: 8.5 g, Rfl: 0    amoxicillin-clavulanate (AUGMENTIN) 875-125 mg per tablet, Take 1 tablet by mouth every 12 (twelve) hours for 7 days, Disp: 14 tablet, Rfl: 0    buPROPion (WELLBUTRIN XL) 150 mg 24 hr tablet, Take 1 tablet (150 mg total) by mouth daily, Disp: 30 tablet, Rfl: 5    hydrochlorothiazide (HYDRODIURIL) 25 mg tablet, Take 1 tablet (25 mg total) by mouth daily, Disp: 30 tablet, Rfl: 5    LORazepam (ATIVAN) 0.5 mg tablet, Take 1 tablet (0.5 mg total) by mouth 2 (two) times a day, Disp: 60 tablet, Rfl: 0    Multiple Vitamin (MULTIVITAMIN) tablet, Take 1 tablet by mouth daily, Disp: , Rfl:     ondansetron (ZOFRAN) 4 mg tablet, Take 1 tablet (4 mg total) by mouth every 8 (eight) hours as needed for nausea or vomiting, Disp: 30 tablet, Rfl: 0    sulfaSALAzine (AZULFIDINE) 500 mg tablet, Take 2 tablets (1,000 mg total) by mouth 2 (two) times a day, Disp: 360 tablet, Rfl: 1    benzonatate (TESSALON) 200 MG capsule, Take 1 capsule (200 mg total) by mouth 3 (three) times a day as needed for cough (Patient not taking: Reported on 11/24/2023), Disp: 20 capsule, Rfl: 0    gabapentin (Neurontin) 100 mg capsule, Take 1 capsule (100 mg total) by mouth 3 (three) times a day (Patient not taking: Reported on 2023), Disp: 90 capsule, Rfl: 0    GLUCOSAMINE-CHONDROITIN PO, Take by mouth daily (Patient not taking: Reported on 3/23/2023), Disp: , Rfl:     ibuprofen (MOTRIN) 800 mg tablet, Take 1 tablet (800 mg total) by mouth every 8 (eight) hours as needed for mild pain or moderate pain, Disp: 90 tablet, Rfl: 3    Red Yeast Rice Extract (RED YEAST RICE PO), red yeast rice (Patient not taking: Reported on 3/23/2023), Disp: , Rfl:     Current Allergies     Allergies as of 2023    (No Known Allergies)            The following portions of the patient's history were reviewed and updated as appropriate: allergies, current medications, past family history, past medical history, past social history, past surgical history and problem list.     Past Medical History:   Diagnosis Date    Anxiety     Arthritis     SOB (shortness of breath)        Past Surgical History:   Procedure Laterality Date     SECTION      TONSILLECTOMY         Family History   Problem Relation Age of Onset    Cancer Mother     Prostate cancer Father     Coronary artery disease Father          Medications have been verified. Objective   BP (!) 185/91   Pulse 85   Temp 98.5 °F (36.9 °C) (Temporal)   Resp 18   SpO2 96%   No LMP recorded. Patient is postmenopausal.       Physical Exam     Physical Exam  Vitals and nursing note reviewed. Constitutional:       General: She is not in acute distress. Appearance: She is well-developed and normal weight. She is not ill-appearing, toxic-appearing or diaphoretic. HENT:      Head: Normocephalic and atraumatic. Right Ear: Tympanic membrane and ear canal normal.      Left Ear: Tympanic membrane and ear canal normal.      Nose: Congestion present. No rhinorrhea. Mouth/Throat:      Mouth: Mucous membranes are moist. No oral lesions. Pharynx: Uvula midline. Pharyngeal swelling and posterior oropharyngeal erythema present. No oropharyngeal exudate.       Tonsils: No tonsillar exudate or tonsillar abscesses. 1+ on the right. 1+ on the left. Eyes:      Extraocular Movements:      Right eye: Normal extraocular motion. Left eye: Normal extraocular motion. Cardiovascular:      Rate and Rhythm: Normal rate and regular rhythm. Heart sounds: Normal heart sounds. No murmur heard. Pulmonary:      Effort: Pulmonary effort is normal.      Comments: Decreased breath sounds Right upper lobe   Dry constant cough   Musculoskeletal:      Cervical back: Normal range of motion and neck supple. Lymphadenopathy:      Cervical: No cervical adenopathy. Skin:     General: Skin is warm and dry. Capillary Refill: Capillary refill takes less than 2 seconds. Neurological:      General: No focal deficit present. Mental Status: She is alert and oriented to person, place, and time. Psychiatric:         Mood and Affect: Mood normal.         Behavior: Behavior normal.         Preliminary reading CXR  ?  B/L infiltrates  Waiting on rad read

## 2023-11-24 NOTE — PATIENT INSTRUCTIONS
Your xray was preliminarily read by your provider. A radiologist will read the xray and you will be notified if it is abnormal.    You have been prescribed augmentin - take an oral probiotic with this medication to help prevent GI upset/vomiting/diarrhea. You are to take robitussin CF - stop the dayquil and nyquil. Take the albuterol MDI if you feel short of breath   You are to take the zofran as needed for nausea, vomiting. Follow up with your PCP in 3-5 days   Go to the ED if symptoms worsen    You have a flu/covid test pending. You are to download [facility] mychart for the results in 24-48 hours. You will be notified if abnormal.    You appear to have covid/symptoms. You are to take vitamin C, D3,  plain robitussin for cough. Do not take cough suppressants; you want to take an expectorant. Sleep on your stomach. You are to quarantine as required per CDC guidelines of 5 days. Mask x 10 days if positive. Mask as long as you have symptoms. See your PCP for follow up in 2-3 days. Go to the ED if symptoms worsen or are severe. You are to call your PCP if your results are + to discuss Paxlovid treatment        You have flu like symptoms. You are to rest. Drink gatorade or pedialyte for rehydration. You are to eat a BRAT diet - bananas, rice, applesauce and toast.  You may try imodium for diarrhea. Take tylenol or motrin for fever or pain. You are to download SL mychart for the results in 24-48 hours. You will be notified if the results are positive. You are to mask x 10 days and mask if still symptomatic after the 10 days. Your strep A is negative. You have a throat culture pending. You are to download SL mychart for the results in 3-4 days. You are to do warm salt water gargles 4 x daily. Drink warm tea with honey and lemon. Take tylenol or motrin as able for pain or fever. Chloraseptic throat spray, cough drops. Do not share utensils. Change your tooth brush in 3 days.   The augmentin will cover strep if +

## 2023-11-24 NOTE — LETTER
November 24, 2023     Patient: Salvador Pang   YOB: 1965   Date of Visit: 11/24/2023       To Whom It May Concern: It is my medical opinion that Salvador Pang may return to work on 11/27/2023 . If you have any questions or concerns, please don't hesitate to call.          Sincerely,        ANTON Flores    CC: No Recipients

## 2023-11-25 LAB
FLUAV RNA RESP QL NAA+PROBE: NEGATIVE
FLUBV RNA RESP QL NAA+PROBE: NEGATIVE
SARS-COV-2 RNA RESP QL NAA+PROBE: NEGATIVE

## 2023-11-26 LAB — BACTERIA THROAT CULT: NORMAL

## 2023-11-28 DIAGNOSIS — G47.00 INSOMNIA, UNSPECIFIED TYPE: ICD-10-CM

## 2023-11-28 RX ORDER — LORAZEPAM 0.5 MG/1
0.5 TABLET ORAL 2 TIMES DAILY
Qty: 60 TABLET | Refills: 0 | Status: SHIPPED | OUTPATIENT
Start: 2023-11-28

## 2024-01-02 DIAGNOSIS — G47.00 INSOMNIA, UNSPECIFIED TYPE: ICD-10-CM

## 2024-01-03 RX ORDER — LORAZEPAM 0.5 MG/1
0.5 TABLET ORAL 2 TIMES DAILY
Qty: 60 TABLET | Refills: 0 | Status: SHIPPED | OUTPATIENT
Start: 2024-01-03

## 2024-01-30 DIAGNOSIS — G47.00 INSOMNIA, UNSPECIFIED TYPE: ICD-10-CM

## 2024-01-30 RX ORDER — LORAZEPAM 0.5 MG/1
0.5 TABLET ORAL 2 TIMES DAILY
Qty: 60 TABLET | Refills: 0 | Status: SHIPPED | OUTPATIENT
Start: 2024-01-30

## 2024-02-01 DIAGNOSIS — I10 ESSENTIAL HYPERTENSION: ICD-10-CM

## 2024-02-01 DIAGNOSIS — F41.1 GAD (GENERALIZED ANXIETY DISORDER): ICD-10-CM

## 2024-02-01 RX ORDER — BUPROPION HYDROCHLORIDE 150 MG/1
150 TABLET ORAL DAILY
Qty: 90 TABLET | Refills: 1 | Status: SHIPPED | OUTPATIENT
Start: 2024-02-01

## 2024-02-01 RX ORDER — HYDROCHLOROTHIAZIDE 25 MG/1
25 TABLET ORAL DAILY
Qty: 90 TABLET | Refills: 1 | Status: SHIPPED | OUTPATIENT
Start: 2024-02-01

## 2024-02-26 DIAGNOSIS — I10 ESSENTIAL HYPERTENSION: ICD-10-CM

## 2024-02-26 DIAGNOSIS — F41.1 GAD (GENERALIZED ANXIETY DISORDER): ICD-10-CM

## 2024-02-26 RX ORDER — BUPROPION HYDROCHLORIDE 150 MG/1
150 TABLET ORAL DAILY
Qty: 90 TABLET | Refills: 0 | Status: SHIPPED | OUTPATIENT
Start: 2024-02-26

## 2024-02-26 RX ORDER — HYDROCHLOROTHIAZIDE 25 MG/1
25 TABLET ORAL DAILY
Qty: 90 TABLET | Refills: 1 | Status: SHIPPED | OUTPATIENT
Start: 2024-02-26

## 2024-03-04 DIAGNOSIS — G47.00 INSOMNIA, UNSPECIFIED TYPE: ICD-10-CM

## 2024-03-04 RX ORDER — LORAZEPAM 0.5 MG/1
0.5 TABLET ORAL 2 TIMES DAILY
Qty: 60 TABLET | Refills: 0 | Status: SHIPPED | OUTPATIENT
Start: 2024-03-04

## 2024-04-10 DIAGNOSIS — G47.00 INSOMNIA, UNSPECIFIED TYPE: ICD-10-CM

## 2024-04-11 RX ORDER — LORAZEPAM 0.5 MG/1
0.5 TABLET ORAL 2 TIMES DAILY
Qty: 60 TABLET | Refills: 0 | Status: SHIPPED | OUTPATIENT
Start: 2024-04-11

## 2024-05-27 DIAGNOSIS — G47.00 INSOMNIA, UNSPECIFIED TYPE: ICD-10-CM

## 2024-05-27 DIAGNOSIS — F41.1 GAD (GENERALIZED ANXIETY DISORDER): ICD-10-CM

## 2024-05-28 RX ORDER — LORAZEPAM 0.5 MG/1
0.5 TABLET ORAL 2 TIMES DAILY
Qty: 60 TABLET | Refills: 0 | Status: SHIPPED | OUTPATIENT
Start: 2024-05-28

## 2024-05-28 RX ORDER — BUPROPION HYDROCHLORIDE 150 MG/1
150 TABLET ORAL DAILY
Qty: 90 TABLET | Refills: 0 | Status: SHIPPED | OUTPATIENT
Start: 2024-05-28

## 2024-07-08 DIAGNOSIS — G47.00 INSOMNIA, UNSPECIFIED TYPE: ICD-10-CM

## 2024-07-08 RX ORDER — LORAZEPAM 0.5 MG/1
0.5 TABLET ORAL 2 TIMES DAILY
Qty: 60 TABLET | Refills: 0 | Status: SHIPPED | OUTPATIENT
Start: 2024-07-08 | End: 2024-07-17 | Stop reason: SDUPTHER

## 2024-07-08 NOTE — TELEPHONE ENCOUNTER
Reason for call:   [x] Refill   [] Prior Auth  [] Other:     Office:   [x] PCP/Provider - lala garcia  [] Specialty/Provider -     Medication:     Does the patient have enough for 3 days?   [] Yes   [x] No - Send as HP to POD

## 2024-07-11 ENCOUNTER — RA CDI HCC (OUTPATIENT)
Dept: OTHER | Facility: HOSPITAL | Age: 59
End: 2024-07-11

## 2024-07-17 ENCOUNTER — OFFICE VISIT (OUTPATIENT)
Dept: INTERNAL MEDICINE CLINIC | Facility: CLINIC | Age: 59
End: 2024-07-17
Payer: COMMERCIAL

## 2024-07-17 VITALS
DIASTOLIC BLOOD PRESSURE: 88 MMHG | TEMPERATURE: 98.2 F | SYSTOLIC BLOOD PRESSURE: 124 MMHG | WEIGHT: 171 LBS | HEART RATE: 76 BPM | OXYGEN SATURATION: 98 % | BODY MASS INDEX: 25.91 KG/M2 | HEIGHT: 68 IN

## 2024-07-17 DIAGNOSIS — Z13.29 SCREENING FOR THYROID DISORDER: ICD-10-CM

## 2024-07-17 DIAGNOSIS — Z23 ENCOUNTER FOR IMMUNIZATION: ICD-10-CM

## 2024-07-17 DIAGNOSIS — E55.9 VITAMIN D DEFICIENCY: ICD-10-CM

## 2024-07-17 DIAGNOSIS — Z11.4 SCREENING FOR HIV (HUMAN IMMUNODEFICIENCY VIRUS): ICD-10-CM

## 2024-07-17 DIAGNOSIS — I10 ESSENTIAL HYPERTENSION: ICD-10-CM

## 2024-07-17 DIAGNOSIS — D22.9 ATYPICAL NEVUS: ICD-10-CM

## 2024-07-17 DIAGNOSIS — M06.9 RHEUMATOID ARTHRITIS, INVOLVING UNSPECIFIED SITE, UNSPECIFIED WHETHER RHEUMATOID FACTOR PRESENT (HCC): ICD-10-CM

## 2024-07-17 DIAGNOSIS — Z13.220 SCREENING, LIPID: ICD-10-CM

## 2024-07-17 DIAGNOSIS — Z13.1 SCREENING FOR DIABETES MELLITUS: ICD-10-CM

## 2024-07-17 DIAGNOSIS — Z00.00 WELL ADULT EXAM: Primary | ICD-10-CM

## 2024-07-17 DIAGNOSIS — G47.00 INSOMNIA, UNSPECIFIED TYPE: ICD-10-CM

## 2024-07-17 DIAGNOSIS — F41.1 GAD (GENERALIZED ANXIETY DISORDER): ICD-10-CM

## 2024-07-17 DIAGNOSIS — Z12.31 ENCOUNTER FOR SCREENING MAMMOGRAM FOR BREAST CANCER: ICD-10-CM

## 2024-07-17 DIAGNOSIS — Z13.0 SCREENING FOR DEFICIENCY ANEMIA: ICD-10-CM

## 2024-07-17 PROCEDURE — 99396 PREV VISIT EST AGE 40-64: CPT | Performed by: PHYSICIAN ASSISTANT

## 2024-07-17 RX ORDER — BUPROPION HYDROCHLORIDE 150 MG/1
150 TABLET ORAL DAILY
Qty: 90 TABLET | Refills: 0 | Status: SHIPPED | OUTPATIENT
Start: 2024-07-17

## 2024-07-17 RX ORDER — HYDROCHLOROTHIAZIDE 25 MG/1
25 TABLET ORAL DAILY
Qty: 90 TABLET | Refills: 1 | Status: SHIPPED | OUTPATIENT
Start: 2024-07-17

## 2024-07-17 RX ORDER — LORAZEPAM 0.5 MG/1
0.5 TABLET ORAL 2 TIMES DAILY
Qty: 60 TABLET | Refills: 0 | Status: SHIPPED | OUTPATIENT
Start: 2024-07-17

## 2024-07-17 NOTE — PROGRESS NOTES
Adult Annual Physical  Name: Chela Deshpande      : 1965      MRN: [id number]  Encounter Provider: Andria Dodd PA-C  Encounter Date: 2024   Encounter department: [facility]    Assessment & Plan   1. Well adult exam  2. Encounter for screening mammogram for breast cancer  -     Mammo screening bilateral w 3d & cad; Future; Expected date: 2024  3. Screening for HIV (human immunodeficiency virus)  -     HIV 1/2 AG/AB w Reflex [facility] for 2 yr old and above; Future  4. Encounter for immunization  -     Zoster Vaccine Recombinant IM  5. Screening, lipid  -     Lipid panel; Future  6. Screening for thyroid disorder  -     TSH, 3rd generation; Future  7. Screening for deficiency anemia  -     CBC and differential; Future  8. Screening for diabetes mellitus  -     Comprehensive metabolic panel; Future  9. Vitamin D deficiency  -     Vitamin D 25 hydroxy; Future  10. Rheumatoid arthritis, involving unspecified site, unspecified whether rheumatoid factor present (HCC)  11. Atypical nevus  -     Ambulatory Referral to Dermatology; Future  12. Essential hypertension  -     hydroCHLOROthiazide 25 mg tablet; Take 1 tablet (25 mg total) by mouth daily  13. VÍCTOR (generalized anxiety disorder)  -     buPROPion (WELLBUTRIN XL) 150 mg 24 hr tablet; Take 1 tablet (150 mg total) by mouth daily  14. Insomnia, unspecified type  -     LORazepam (ATIVAN) 0.5 mg tablet; Take 1 tablet (0.5 mg total) by mouth 2 (two) times a day    Immunizations and preventive care screenings were discussed with patient today. Appropriate education was printed on patient's after visit summary.    Counseling:  Labs and mammogram ordered. CRC screening current. Defers vaccines.   She has a small, raised, non painful growth on her left shin that bleeds when she shaves and rough texture. Possible dermatofibroma vs BCC/SCC. Dermatology referral provided.       Depression Screening and Follow-up Plan: Patient was screened  for depression during today's encounter. They screened negative with a PHQ-2 score of 0.        History of Present Illness     Adult Annual Physical:  Patient presents for annual physical.     Diet and Physical Activity:  - Diet/Nutrition: well balanced diet.  - Exercise: walking.    Depression Screening:  - PHQ-2 Score: 0    General Health:  - Sleep: sleeps well.  - Hearing: normal hearing bilateral ears.  - Vision: goes for regular eye exams.  - Dental: regular dental visits.    /GYN Health:    - Menopause: postmenopausal.   - History of STDs: no    Advanced Care Planning:  - Has an advanced directive?: no    - Has a durable medical POA?: no    - ACP document given to patient?: no      Review of Systems   Constitutional:  Negative for chills and fever.   HENT:  Negative for congestion, ear pain, hearing loss, postnasal drip, rhinorrhea, sinus pressure, sinus pain, sore throat and trouble swallowing.    Eyes:  Negative for pain and visual disturbance.   Respiratory:  Negative for cough, chest tightness, shortness of breath and wheezing.    Cardiovascular: Negative.  Negative for chest pain, palpitations and leg swelling.   Gastrointestinal:  Negative for abdominal pain, blood in stool, constipation, diarrhea, nausea and vomiting.   Endocrine: Negative for cold intolerance, heat intolerance, polydipsia, polyphagia and polyuria.   Genitourinary:  Negative for difficulty urinating, dysuria, flank pain and urgency.   Musculoskeletal:  Negative for arthralgias, back pain, gait problem and myalgias.   Skin:  Negative for rash.   Allergic/Immunologic: Negative.    Neurological:  Negative for dizziness, weakness, light-headedness and headaches.   Hematological: Negative.    Psychiatric/Behavioral:  Negative for behavioral problems, dysphoric mood and sleep disturbance. The patient is not nervous/anxious.      Current Outpatient Medications on File Prior to Visit   Medication Sig Dispense Refill    ibuprofen (MOTRIN) 800  mg tablet Take 1 tablet (800 mg total) by mouth every 8 (eight) hours as needed for mild pain or moderate pain 90 tablet 3    Multiple Vitamin (MULTIVITAMIN) tablet Take 1 tablet by mouth daily      ondansetron (ZOFRAN) 4 mg tablet Take 1 tablet (4 mg total) by mouth every 8 (eight) hours as needed for nausea or vomiting 30 tablet 0    [DISCONTINUED] buPROPion (WELLBUTRIN XL) 150 mg 24 hr tablet Take 1 tablet (150 mg total) by mouth daily 90 tablet 0    [DISCONTINUED] hydroCHLOROthiazide 25 mg tablet Take 1 tablet (25 mg total) by mouth daily 90 tablet 1    [DISCONTINUED] LORazepam (ATIVAN) 0.5 mg tablet Take 1 tablet (0.5 mg total) by mouth 2 (two) times a day 60 tablet 0    [DISCONTINUED] albuterol (ProAir HFA) 90 mcg/act inhaler Inhale 2 puffs every 6 (six) hours as needed for wheezing or shortness of breath (Patient not taking: Reported on 7/17/2024) 8.5 g 0    [DISCONTINUED] benzonatate (TESSALON) 200 MG capsule Take 1 capsule (200 mg total) by mouth 3 (three) times a day as needed for cough (Patient not taking: Reported on 11/24/2023) 20 capsule 0    [DISCONTINUED] gabapentin (Neurontin) 100 mg capsule Take 1 capsule (100 mg total) by mouth 3 (three) times a day (Patient not taking: Reported on 11/24/2023) 90 capsule 0    [DISCONTINUED] GLUCOSAMINE-CHONDROITIN PO Take by mouth daily (Patient not taking: Reported on 3/23/2023)      [DISCONTINUED] Red Yeast Rice Extract (RED YEAST RICE PO) red yeast rice (Patient not taking: Reported on 3/23/2023)      [DISCONTINUED] sulfaSALAzine (AZULFIDINE) 500 mg tablet Take 2 tablets (1,000 mg total) by mouth 2 (two) times a day (Patient not taking: Reported on 7/17/2024) 360 tablet 1     No current facility-administered medications on file prior to visit.      Social History     Tobacco Use    Smoking status: Former     Passive exposure: Never    Smokeless tobacco: Never    Tobacco comments:     quit 15 yrs ago   Vaping Use    Vaping status: Never Used   Substance and  "Sexual Activity    Alcohol use: Yes     Comment: Social    Drug use: Never    Sexual activity: Not on file       Objective     /88   Pulse 76   Temp 98.2 °F (36.8 °C)   Ht 5' 8\" (1.727 m)   Wt 77.6 kg (171 lb)   SpO2 98%   BMI 26.00 kg/m²     Physical Exam  Vitals and nursing note reviewed.   Constitutional:       General: She is not in acute distress.     Appearance: Normal appearance. She is well-developed. She is not diaphoretic.   HENT:      Head: Normocephalic and atraumatic.      Right Ear: External ear normal.      Left Ear: External ear normal.      Nose: Nose normal.      Mouth/Throat:      Pharynx: No oropharyngeal exudate.   Eyes:      General: No scleral icterus.        Right eye: No discharge.         Left eye: No discharge.      Conjunctiva/sclera: Conjunctivae normal.      Pupils: Pupils are equal, round, and reactive to light.   Neck:      Thyroid: No thyromegaly.   Cardiovascular:      Rate and Rhythm: Normal rate and regular rhythm.      Heart sounds: Normal heart sounds. No murmur heard.     No friction rub. No gallop.   Pulmonary:      Effort: Pulmonary effort is normal. No respiratory distress.      Breath sounds: Normal breath sounds. No wheezing or rales.   Abdominal:      General: Bowel sounds are normal. There is no distension.      Palpations: Abdomen is soft.      Tenderness: There is no abdominal tenderness.   Musculoskeletal:         General: No tenderness or deformity. Normal range of motion.      Cervical back: Normal range of motion and neck supple.   Skin:     General: Skin is warm and dry.          Neurological:      Mental Status: She is alert and oriented to person, place, and time.      Cranial Nerves: No cranial nerve deficit.   Psychiatric:         Behavior: Behavior normal.         Thought Content: Thought content normal.         Judgment: Judgment normal.       Administrative Statements   I have spent a total time of 20 minutes in caring for this patient on the " day of the visit/encounter including Risks and benefits of tx options, Instructions for management, Patient and family education, Importance of tx compliance, Risk factor reductions, Impressions, Counseling / Coordination of care, Documenting in the medical record, Reviewing / ordering tests, medicine, procedures  , and Obtaining or reviewing history  .

## 2024-07-26 DIAGNOSIS — G47.00 INSOMNIA, UNSPECIFIED TYPE: ICD-10-CM

## 2024-07-29 RX ORDER — LORAZEPAM 0.5 MG/1
0.5 TABLET ORAL 2 TIMES DAILY
Qty: 60 TABLET | Refills: 0 | Status: SHIPPED | OUTPATIENT
Start: 2024-07-29

## 2024-09-07 DIAGNOSIS — G47.00 INSOMNIA, UNSPECIFIED TYPE: ICD-10-CM

## 2024-09-09 RX ORDER — LORAZEPAM 0.5 MG/1
0.5 TABLET ORAL 2 TIMES DAILY
Qty: 60 TABLET | Refills: 0 | Status: SHIPPED | OUTPATIENT
Start: 2024-09-09

## 2024-10-27 DIAGNOSIS — F41.1 GAD (GENERALIZED ANXIETY DISORDER): ICD-10-CM

## 2024-10-28 DIAGNOSIS — F41.1 GAD (GENERALIZED ANXIETY DISORDER): ICD-10-CM

## 2024-10-28 DIAGNOSIS — G47.00 INSOMNIA, UNSPECIFIED TYPE: ICD-10-CM

## 2024-10-28 DIAGNOSIS — I10 ESSENTIAL HYPERTENSION: ICD-10-CM

## 2024-10-28 RX ORDER — BUPROPION HYDROCHLORIDE 150 MG/1
150 TABLET ORAL DAILY
Qty: 30 TABLET | Refills: 5 | Status: SHIPPED | OUTPATIENT
Start: 2024-10-28 | End: 2024-10-28 | Stop reason: SDUPTHER

## 2024-10-29 RX ORDER — BUPROPION HYDROCHLORIDE 150 MG/1
150 TABLET ORAL DAILY
Qty: 90 TABLET | Refills: 1 | Status: SHIPPED | OUTPATIENT
Start: 2024-10-29

## 2024-10-29 RX ORDER — HYDROCHLOROTHIAZIDE 25 MG/1
25 TABLET ORAL DAILY
Qty: 90 TABLET | Refills: 1 | Status: SHIPPED | OUTPATIENT
Start: 2024-10-29

## 2024-10-29 RX ORDER — LORAZEPAM 0.5 MG/1
0.5 TABLET ORAL 2 TIMES DAILY
Qty: 60 TABLET | Refills: 0 | Status: SHIPPED | OUTPATIENT
Start: 2024-10-29

## 2024-12-23 DIAGNOSIS — G47.00 INSOMNIA, UNSPECIFIED TYPE: ICD-10-CM

## 2024-12-26 RX ORDER — LORAZEPAM 0.5 MG/1
0.5 TABLET ORAL 2 TIMES DAILY
Qty: 60 TABLET | Refills: 0 | Status: SHIPPED | OUTPATIENT
Start: 2024-12-26

## 2025-01-06 DIAGNOSIS — F41.1 GAD (GENERALIZED ANXIETY DISORDER): ICD-10-CM

## 2025-01-06 DIAGNOSIS — G47.00 INSOMNIA, UNSPECIFIED TYPE: ICD-10-CM

## 2025-01-06 DIAGNOSIS — I10 ESSENTIAL HYPERTENSION: ICD-10-CM

## 2025-01-07 RX ORDER — HYDROCHLOROTHIAZIDE 25 MG/1
25 TABLET ORAL DAILY
Qty: 30 TABLET | Refills: 0 | Status: SHIPPED | OUTPATIENT
Start: 2025-01-07

## 2025-01-07 RX ORDER — LORAZEPAM 0.5 MG/1
0.5 TABLET ORAL 2 TIMES DAILY
Qty: 60 TABLET | Refills: 0 | Status: SHIPPED | OUTPATIENT
Start: 2025-01-07

## 2025-01-07 RX ORDER — BUPROPION HYDROCHLORIDE 150 MG/1
150 TABLET ORAL DAILY
Qty: 90 TABLET | Refills: 0 | Status: SHIPPED | OUTPATIENT
Start: 2025-01-07

## 2025-02-15 DIAGNOSIS — I10 ESSENTIAL HYPERTENSION: ICD-10-CM

## 2025-02-15 DIAGNOSIS — G47.00 INSOMNIA, UNSPECIFIED TYPE: ICD-10-CM

## 2025-02-17 RX ORDER — HYDROCHLOROTHIAZIDE 25 MG/1
25 TABLET ORAL DAILY
Qty: 30 TABLET | Refills: 0 | Status: SHIPPED | OUTPATIENT
Start: 2025-02-17

## 2025-02-17 RX ORDER — LORAZEPAM 0.5 MG/1
0.5 TABLET ORAL 2 TIMES DAILY
Qty: 60 TABLET | Refills: 0 | Status: SHIPPED | OUTPATIENT
Start: 2025-02-17

## 2025-03-17 DIAGNOSIS — I10 ESSENTIAL HYPERTENSION: ICD-10-CM

## 2025-03-17 DIAGNOSIS — G47.00 INSOMNIA, UNSPECIFIED TYPE: ICD-10-CM

## 2025-03-17 DIAGNOSIS — F41.1 GAD (GENERALIZED ANXIETY DISORDER): ICD-10-CM

## 2025-03-18 RX ORDER — LORAZEPAM 0.5 MG/1
0.5 TABLET ORAL 2 TIMES DAILY
Qty: 60 TABLET | Refills: 0 | Status: SHIPPED | OUTPATIENT
Start: 2025-03-18

## 2025-03-18 RX ORDER — BUPROPION HYDROCHLORIDE 150 MG/1
150 TABLET ORAL DAILY
Qty: 90 TABLET | Refills: 1 | Status: SHIPPED | OUTPATIENT
Start: 2025-03-18

## 2025-03-18 RX ORDER — HYDROCHLOROTHIAZIDE 25 MG/1
25 TABLET ORAL DAILY
Qty: 30 TABLET | Refills: 0 | Status: SHIPPED | OUTPATIENT
Start: 2025-03-18

## 2025-04-21 DIAGNOSIS — G47.00 INSOMNIA, UNSPECIFIED TYPE: ICD-10-CM

## 2025-04-23 RX ORDER — LORAZEPAM 0.5 MG/1
0.5 TABLET ORAL 2 TIMES DAILY
Qty: 60 TABLET | Refills: 0 | Status: SHIPPED | OUTPATIENT
Start: 2025-04-23

## 2025-04-24 ENCOUNTER — HOSPITAL ENCOUNTER (OUTPATIENT)
Dept: MAMMOGRAPHY | Facility: HOSPITAL | Age: 60
End: 2025-04-24
Payer: COMMERCIAL

## 2025-04-24 DIAGNOSIS — Z12.31 ENCOUNTER FOR SCREENING MAMMOGRAM FOR BREAST CANCER: ICD-10-CM

## 2025-04-24 PROCEDURE — 77063 BREAST TOMOSYNTHESIS BI: CPT

## 2025-04-24 PROCEDURE — 77067 SCR MAMMO BI INCL CAD: CPT

## 2025-05-01 ENCOUNTER — RESULTS FOLLOW-UP (OUTPATIENT)
Dept: INTERNAL MEDICINE CLINIC | Facility: CLINIC | Age: 60
End: 2025-05-01

## 2025-05-01 NOTE — RESULT ENCOUNTER NOTE
Francis Kuo  I reviewed your mammogram and it was was normal. We recommend repeating in one year for maintenance. Have a great day.   -Andria

## 2025-05-15 DIAGNOSIS — G47.00 INSOMNIA, UNSPECIFIED TYPE: ICD-10-CM

## 2025-05-15 DIAGNOSIS — F41.1 GAD (GENERALIZED ANXIETY DISORDER): ICD-10-CM

## 2025-05-15 DIAGNOSIS — M06.9 RHEUMATOID ARTHRITIS (HCC): ICD-10-CM

## 2025-05-15 RX ORDER — LORAZEPAM 0.5 MG/1
0.5 TABLET ORAL 2 TIMES DAILY
Qty: 60 TABLET | Refills: 0 | Status: SHIPPED | OUTPATIENT
Start: 2025-05-15

## 2025-05-15 RX ORDER — BUPROPION HYDROCHLORIDE 150 MG/1
150 TABLET ORAL DAILY
Qty: 90 TABLET | Refills: 0 | Status: SHIPPED | OUTPATIENT
Start: 2025-05-15

## 2025-05-16 RX ORDER — IBUPROFEN 800 MG/1
800 TABLET, FILM COATED ORAL EVERY 8 HOURS PRN
Qty: 90 TABLET | Refills: 0 | Status: SHIPPED | OUTPATIENT
Start: 2025-05-16

## 2025-06-24 DIAGNOSIS — G47.00 INSOMNIA, UNSPECIFIED TYPE: ICD-10-CM

## 2025-06-24 DIAGNOSIS — M06.9 RHEUMATOID ARTHRITIS (HCC): ICD-10-CM

## 2025-06-25 RX ORDER — IBUPROFEN 800 MG/1
800 TABLET, FILM COATED ORAL EVERY 8 HOURS PRN
Qty: 90 TABLET | Refills: 0 | Status: SHIPPED | OUTPATIENT
Start: 2025-06-25

## 2025-06-25 RX ORDER — LORAZEPAM 0.5 MG/1
0.5 TABLET ORAL 2 TIMES DAILY
Qty: 60 TABLET | Refills: 0 | Status: SHIPPED | OUTPATIENT
Start: 2025-06-25

## 2025-07-17 ENCOUNTER — RA CDI HCC (OUTPATIENT)
Dept: OTHER | Facility: HOSPITAL | Age: 60
End: 2025-07-17

## 2025-07-30 DIAGNOSIS — G47.00 INSOMNIA, UNSPECIFIED TYPE: ICD-10-CM

## 2025-07-30 DIAGNOSIS — I10 ESSENTIAL HYPERTENSION: ICD-10-CM

## 2025-07-30 DIAGNOSIS — F41.1 GAD (GENERALIZED ANXIETY DISORDER): ICD-10-CM

## 2025-07-30 DIAGNOSIS — M06.9 RHEUMATOID ARTHRITIS (HCC): ICD-10-CM

## 2025-07-30 RX ORDER — HYDROCHLOROTHIAZIDE 25 MG/1
25 TABLET ORAL DAILY
Qty: 30 TABLET | Refills: 0 | Status: SHIPPED | OUTPATIENT
Start: 2025-07-30

## 2025-07-30 RX ORDER — LORAZEPAM 0.5 MG/1
0.5 TABLET ORAL 2 TIMES DAILY
Qty: 60 TABLET | Refills: 0 | Status: SHIPPED | OUTPATIENT
Start: 2025-07-30

## 2025-07-30 RX ORDER — BUPROPION HYDROCHLORIDE 150 MG/1
150 TABLET ORAL DAILY
Qty: 90 TABLET | Refills: 0 | Status: SHIPPED | OUTPATIENT
Start: 2025-07-30

## 2025-07-30 RX ORDER — IBUPROFEN 800 MG/1
800 TABLET, FILM COATED ORAL EVERY 8 HOURS PRN
Qty: 90 TABLET | Refills: 0 | Status: SHIPPED | OUTPATIENT
Start: 2025-07-30